# Patient Record
Sex: FEMALE | Race: WHITE
[De-identification: names, ages, dates, MRNs, and addresses within clinical notes are randomized per-mention and may not be internally consistent; named-entity substitution may affect disease eponyms.]

---

## 2017-12-16 ENCOUNTER — HOSPITAL ENCOUNTER (INPATIENT)
Dept: HOSPITAL 61 - ER | Age: 62
LOS: 3 days | Discharge: HOME | DRG: 291 | End: 2017-12-19
Attending: INTERNAL MEDICINE | Admitting: INTERNAL MEDICINE
Payer: MEDICARE

## 2017-12-16 VITALS — SYSTOLIC BLOOD PRESSURE: 135 MMHG | DIASTOLIC BLOOD PRESSURE: 71 MMHG

## 2017-12-16 VITALS — SYSTOLIC BLOOD PRESSURE: 162 MMHG | DIASTOLIC BLOOD PRESSURE: 78 MMHG

## 2017-12-16 VITALS — SYSTOLIC BLOOD PRESSURE: 151 MMHG | DIASTOLIC BLOOD PRESSURE: 73 MMHG

## 2017-12-16 VITALS — DIASTOLIC BLOOD PRESSURE: 71 MMHG | SYSTOLIC BLOOD PRESSURE: 150 MMHG

## 2017-12-16 VITALS — DIASTOLIC BLOOD PRESSURE: 77 MMHG | SYSTOLIC BLOOD PRESSURE: 130 MMHG

## 2017-12-16 VITALS — DIASTOLIC BLOOD PRESSURE: 69 MMHG | SYSTOLIC BLOOD PRESSURE: 132 MMHG

## 2017-12-16 VITALS — SYSTOLIC BLOOD PRESSURE: 158 MMHG | DIASTOLIC BLOOD PRESSURE: 77 MMHG

## 2017-12-16 VITALS — SYSTOLIC BLOOD PRESSURE: 156 MMHG | DIASTOLIC BLOOD PRESSURE: 78 MMHG

## 2017-12-16 VITALS — WEIGHT: 222.25 LBS | HEIGHT: 64 IN | BODY MASS INDEX: 37.94 KG/M2

## 2017-12-16 VITALS — DIASTOLIC BLOOD PRESSURE: 71 MMHG | SYSTOLIC BLOOD PRESSURE: 143 MMHG

## 2017-12-16 VITALS — SYSTOLIC BLOOD PRESSURE: 149 MMHG | DIASTOLIC BLOOD PRESSURE: 73 MMHG

## 2017-12-16 DIAGNOSIS — I25.10: ICD-10-CM

## 2017-12-16 DIAGNOSIS — I08.1: ICD-10-CM

## 2017-12-16 DIAGNOSIS — I50.33: ICD-10-CM

## 2017-12-16 DIAGNOSIS — E66.9: ICD-10-CM

## 2017-12-16 DIAGNOSIS — J44.0: ICD-10-CM

## 2017-12-16 DIAGNOSIS — I11.0: Primary | ICD-10-CM

## 2017-12-16 DIAGNOSIS — J18.9: ICD-10-CM

## 2017-12-16 DIAGNOSIS — Z90.49: ICD-10-CM

## 2017-12-16 DIAGNOSIS — Z98.51: ICD-10-CM

## 2017-12-16 DIAGNOSIS — E11.9: ICD-10-CM

## 2017-12-16 DIAGNOSIS — J96.21: ICD-10-CM

## 2017-12-16 DIAGNOSIS — I24.8: ICD-10-CM

## 2017-12-16 DIAGNOSIS — F32.9: ICD-10-CM

## 2017-12-16 DIAGNOSIS — R17: ICD-10-CM

## 2017-12-16 DIAGNOSIS — Z82.49: ICD-10-CM

## 2017-12-16 LAB
ALBUMIN SERPL-MCNC: 3.5 G/DL (ref 3.4–5)
ALP SERPL-CCNC: 111 U/L (ref 46–116)
ALT SERPL-CCNC: 24 U/L (ref 14–59)
ANION GAP SERPL CALC-SCNC: 10 MMOL/L (ref 6–14)
AST SERPL-CCNC: 30 U/L (ref 15–37)
BASOPHILS # BLD AUTO: 0.1 X10^3/UL (ref 0–0.2)
BASOPHILS NFR BLD: 1 % (ref 0–3)
BILIRUB DIRECT SERPL-MCNC: 0.1 MG/DL (ref 0–0.2)
BILIRUB SERPL-MCNC: 0.6 MG/DL (ref 0.2–1)
BUN SERPL-MCNC: 12 MG/DL (ref 7–20)
CALCIUM SERPL-MCNC: 8.5 MG/DL (ref 8.5–10.1)
CHLORIDE SERPL-SCNC: 100 MMOL/L (ref 98–107)
CO2 SERPL-SCNC: 30 MMOL/L (ref 21–32)
CREAT SERPL-MCNC: 0.9 MG/DL (ref 0.6–1)
EOSINOPHIL NFR BLD: 0 % (ref 0–3)
ERYTHROCYTE [DISTWIDTH] IN BLOOD BY AUTOMATED COUNT: 14.1 % (ref 11.5–14.5)
GFR SERPLBLD BASED ON 1.73 SQ M-ARVRAT: 63 ML/MIN
GLUCOSE SERPL-MCNC: 195 MG/DL (ref 70–99)
HCT VFR BLD CALC: 41.4 % (ref 36–47)
HGB BLD-MCNC: 13.6 G/DL (ref 12–15.5)
LYMPHOCYTES # BLD: 1.8 X10^3/UL (ref 1–4.8)
LYMPHOCYTES NFR BLD AUTO: 15 % (ref 24–48)
MCH RBC QN AUTO: 29 PG (ref 25–35)
MCHC RBC AUTO-ENTMCNC: 33 G/DL (ref 31–37)
MCV RBC AUTO: 89 FL (ref 79–100)
MONOCYTES NFR BLD: 7 % (ref 0–9)
NEUTROPHILS NFR BLD AUTO: 77 % (ref 31–73)
PLATELET # BLD AUTO: 330 X10^3/UL (ref 140–400)
POTASSIUM SERPL-SCNC: 4.6 MMOL/L (ref 3.5–5.1)
PROT SERPL-MCNC: 8.1 G/DL (ref 6.4–8.2)
RBC # BLD AUTO: 4.67 X10^6/UL (ref 3.5–5.4)
SODIUM SERPL-SCNC: 140 MMOL/L (ref 136–145)
WBC # BLD AUTO: 12.3 X10^3/UL (ref 4–11)

## 2017-12-16 PROCEDURE — 71010: CPT

## 2017-12-16 PROCEDURE — 80076 HEPATIC FUNCTION PANEL: CPT

## 2017-12-16 PROCEDURE — 83036 HEMOGLOBIN GLYCOSYLATED A1C: CPT

## 2017-12-16 PROCEDURE — 93005 ELECTROCARDIOGRAM TRACING: CPT

## 2017-12-16 PROCEDURE — 82962 GLUCOSE BLOOD TEST: CPT

## 2017-12-16 PROCEDURE — 87324 CLOSTRIDIUM AG IA: CPT

## 2017-12-16 PROCEDURE — 93306 TTE W/DOPPLER COMPLETE: CPT

## 2017-12-16 PROCEDURE — 83690 ASSAY OF LIPASE: CPT

## 2017-12-16 PROCEDURE — 80061 LIPID PANEL: CPT

## 2017-12-16 PROCEDURE — 71275 CT ANGIOGRAPHY CHEST: CPT

## 2017-12-16 PROCEDURE — 83880 ASSAY OF NATRIURETIC PEPTIDE: CPT

## 2017-12-16 PROCEDURE — 96374 THER/PROPH/DIAG INJ IV PUSH: CPT

## 2017-12-16 PROCEDURE — 84484 ASSAY OF TROPONIN QUANT: CPT

## 2017-12-16 PROCEDURE — 96375 TX/PRO/DX INJ NEW DRUG ADDON: CPT

## 2017-12-16 PROCEDURE — 80048 BASIC METABOLIC PNL TOTAL CA: CPT

## 2017-12-16 PROCEDURE — 87641 MR-STAPH DNA AMP PROBE: CPT

## 2017-12-16 PROCEDURE — 85025 COMPLETE CBC W/AUTO DIFF WBC: CPT

## 2017-12-16 PROCEDURE — 36415 COLL VENOUS BLD VENIPUNCTURE: CPT

## 2017-12-16 RX ADMIN — SIMVASTATIN SCH MG: 40 TABLET, FILM COATED ORAL at 21:15

## 2017-12-16 RX ADMIN — OXYBUTYNIN CHLORIDE SCH MG: 5 TABLET ORAL at 21:15

## 2017-12-16 RX ADMIN — METOPROLOL TARTRATE SCH MG: 50 TABLET, FILM COATED ORAL at 21:16

## 2017-12-16 RX ADMIN — HYDROCODONE BITARTRATE AND ACETAMINOPHEN PRN TAB: 5; 325 TABLET ORAL at 19:45

## 2017-12-16 NOTE — PDOC1
History and Physical


Date of Admission


Date of Admission


DATE: 12/16/17 


TIME: 18:21





Identification/Chief Complaint


Chief Complaint


short of breath


Problems:  





Source


Source:  Chart review, Patient





History of Present Illness


History of Present Illness





Ms. Martell, is a 62-year-old femalea admit to ICU  with acute shortness of 

breath.  > 24 hours of symptoms,  has worsened over time, she lives north of 

[facility] in [place] and was visiting her daughter the past few days, and had 

worsening weakness and dyspnea and LE swelling.  She takes lasix, but has not 

cardiac disease she is aware of, and has not had an echo that she can recall


 She has a history of hypertension diabetes and coronary artery disease. She 

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  She has never heard of interstitial lung disease. 


shortness of breath at rest today,   w. exertion for days


 no chest pain





Past Medical History


Cardiovascular:  HTN


Pulmonary:  Other (brown lung disease)


GI:  No pertinent hx


Psych:  Depression


Endocrine:  Diabetes





Family History


Family History:  Other (lung disease from glove factory)





Social History


Smoke:  No


ALCOHOL:  rare


Drugs:  None





Current Problem List


Problem List


Problems


Medical Problems:


(1) SOB (shortness of breath)


Status: Acute  








Problems:  





Current Medications


Current Medications





Current Medications


Albuterol/ Ipratropium (Duoneb) 3 ml 1X  ONCE NEB  Last administered on 12/16/ 17at 13:59;  Start 12/16/17 at 14:00;  Stop 12/16/17 at 14:01;  Status DC


Ondansetron HCl (Zofran) 4 mg STK-MED ONCE .ROUTE ;  Start 12/16/17 at 14:31;  

Stop 12/16/17 at 14:32;  Status DC


Furosemide (Lasix) 40 mg 1X  ONCE IVP  Last administered on 12/16/17at 14:40;  

Start 12/16/17 at 14:45;  Stop 12/16/17 at 14:46;  Status DC


Ondansetron HCl (Zofran) 4 mg 1X  ONCE IV  Last administered on 12/16/17at 14:37

;  Start 12/16/17 at 14:45;  Stop 12/16/17 at 14:46;  Status DC


Iohexol (Omnipaque 300 Mg/ml) 75 ml 1X  ONCE IV  Last administered on 12/16/ 17at 15:17;  Start 12/16/17 at 15:15;  Stop 12/16/17 at 15:16;  Status DC


Ondansetron HCl (Zofran) 4 mg PRN Q8HRS  PRN IV NAUSEA/VOMITING;  Start 12/16/ 17 at 15:45;  Stop 12/17/17 at 15:44


Morphine Sulfate 2 mg PRN Q2HR  PRN IV PAIN;  Start 12/16/17 at 15:45;  Stop 12/ 17/17 at 15:44


Aspirin (Children'S Aspirin) 324 mg 1X  ONCE PO  Last administered on 12/16/ 17at 16:03;  Start 12/16/17 at 16:00;  Stop 12/16/17 at 16:01;  Status DC





Active Scripts


Active


Reported


[potassium OTC]     


Hydrocodone-Apap 5-325  ** (Hydrocodone Bit/Acetaminophen) 1 Each Tablet 1 Tab 

PO PRN Q6HRS PRN


Macrobid 100 Mg Capsule (Nitrofurantoin Monohyd/M-Cryst) 100 Mg Capsule 100 Mg 

PO DAILY


Probiotic (Lactobacillus Combo No.11) 1 Each Cap.sprink 1 Each PO DAILY


Simvastatin 40 Mg Tablet 1 Tab PO QHS


Cymbalta (Duloxetine Hcl) 60 Mg Capsule.dr 1 Cap PO DAILY


Metformin Hcl Er (Metformin Hcl) 500 Mg Tab.er.24h 2 Tab PO BIDAC


Metoprolol Tartrate 100 Mg Tablet 1 Tab PO BID


Losartan Potassium 100 Mg Tablet 100 Mg PO DAILY


Lasix (Furosemide) 20 Mg Tablet 3 Tab PO DAILY


Omeprazole 40 Mg Capsule.dr 1 Cap PO DAILY


Myrbetriq (Mirabegron) 50 Mg Tab.er.24h 50 Mg PO DAILY


Procardia Xl (Nifedipine) 90 Mg Tab.er.24 1 Tab PO DAILY


Aspirin 81 Mg Tab.chew 1 Tab PO DAILY


Vitamin D3 (Cholecalciferol (Vitamin D3)) 1,000 Unit Tablet 1 Tab PO DAILY


Zyrtec (Cetirizine Hcl) 10 Mg Capsule 10 Mg PO





Allergies


Allergies:  


Coded Allergies:  


     No Known Drug Allergies (Unverified , 12/16/17)





ROS


General:  No: Chills, Night Sweats, Fatigue, Malaise, Appetite, Other


PSYCHOLOGICAL ROS:  No: Anxiety, Behavioral Disorder, Concentration difficultie

, Decreased libido, Depression, Disorientation, Hallucinations, Hostility, 

Irritablity, Memory difficulties, Mood Swings, Obsessive thoughts, Physical 

abuse, Sexual abuse, Sleep disturbances, Suicidal ideation, Other


Eyes:  No Blurry vision, No Decreased vision, No Double vision, No Dry eyes, No 

Excessive tearing, No Eye Pain, No Itchy Eyes, No Loss of vision, No Photophobia

, No Scotomata, No Uses contacts, No Uses glasses, No Other


HEENT:  No: Heacaches, Visual Changes, Hearing change, Nasal congestion, Nasal 

discharge, Oral lesions, Sinus pain, Sore Throat, Epistaxis, Sneezing, Snoring, 

Tinnitus, Vertigo, Vocal changes, Other


Respiratory:  YES: Orthopnea, Shortness of breath, SOB with excertion, Tachypnea

, 


   No: Cough, Hemoptysis, Pleuritic Pain, Sputum Changes, Stridor, Wheezing, 

Other


Cardiovascular:  yes Edema, 


   No Palpitations, No Orthopnea, No Paroxysmal Noc. Dyspnea, No Lt Headedness, 

No Other


Gastrointestinal:  Yes Nausea, 


   No Vomiting, No Abdominal Pain, No Diarrhea, No Constipation, No Melena, No 

Hematochezia, No Other


Genitourinary:  No Dysuria, No Frequency, No Incontinence, No Hematuria, No 

Retention, No Discharge, No Urgency, No Pain, No Flank Pain, No Other, No , No 

, No , No , No , No , No 


Musculoskeletal:  Yes Joint Stiffness


Neurological:  No Behavorial Changes, No Bowel/Bladder ControlChng, No Confusion

, No Dizziness, No Gait Disturbance, No Headaches, No Impaired Coord/balance, 

No Memory Loss, No Numbness/Tingling, No Seizures, No Speech Problems, No 

Tremors, No Visual Changes, No Weakness, No Other


Skin:  Yes Dry Skin





Physical Exam


General:  Alert, Oriented X3, Cooperative, mild distress


HEENT:  Atraumatic, PERRLA, EOMI, Mucous membr. moist/pink


Lungs:  Clear to auscultation


Heart:  no gallops, no murmurs


Abdomen:  Normal bowel sounds, Soft


Rectal Exam:  not examined


Extremities:  No clubbing, Normal pulses, Other (2+ LE edema pedal )


Skin:  No rashes, No breakdown


Neuro:  Normal speech, Sensation intact, Cranial nerves 3-12 NL


Psych/Mental Status:  Mental status NL, Mood NL





Vitals


Vitals





Vital Signs








  Date Time  Temp Pulse Resp B/P (MAP) Pulse Ox O2 Delivery O2 Flow Rate FiO2


 


12/16/17 18:00  74 27 130/77 (94) 95 NonRebreather Mask  


 


12/16/17 16:20 98.5       





 98.5       


 


12/16/17 16:00       10.0 











Labs


Labs





Laboratory Tests








Test


  12/16/17


13:55


 


White Blood Count


  12.3 x10^3/uL


(4.0-11.0)


 


Red Blood Count


  4.67 x10^6/uL


(3.50-5.40)


 


Hemoglobin


  13.6 g/dL


(12.0-15.5)


 


Hematocrit


  41.4 %


(36.0-47.0)


 


Mean Corpuscular Volume 89 fL () 


 


Mean Corpuscular Hemoglobin 29 pg (25-35) 


 


Mean Corpuscular Hemoglobin


Concent 33 g/dL


(31-37)


 


Red Cell Distribution Width


  14.1 %


(11.5-14.5)


 


Platelet Count


  330 x10^3/uL


(140-400)


 


Neutrophils (%) (Auto) 77 % (31-73) 


 


Lymphocytes (%) (Auto) 15 % (24-48) 


 


Monocytes (%) (Auto) 7 % (0-9) 


 


Eosinophils (%) (Auto) 0 % (0-3) 


 


Basophils (%) (Auto) 1 % (0-3) 


 


Neutrophils # (Auto)


  9.5 x10^3uL


(1.8-7.7)


 


Lymphocytes # (Auto)


  1.8 x10^3/uL


(1.0-4.8)


 


Monocytes # (Auto)


  0.8 x10^3/uL


(0.0-1.1)


 


Eosinophils # (Auto)


  0.0 x10^3/uL


(0.0-0.7)


 


Basophils # (Auto)


  0.1 x10^3/uL


(0.0-0.2)


 


Sodium Level


  140 mmol/L


(136-145)


 


Potassium Level


  4.6 mmol/L


(3.5-5.1)


 


Chloride Level


  100 mmol/L


()


 


Carbon Dioxide Level


  30 mmol/L


(21-32)


 


Anion Gap 10 (6-14) 


 


Blood Urea Nitrogen


  12 mg/dL


(7-20)


 


Creatinine


  0.9 mg/dL


(0.6-1.0)


 


Estimated GFR


(Cockcroft-Gault) 63.0 


 


 


Glucose Level


  195 mg/dL


(70-99)


 


Calcium Level


  8.5 mg/dL


(8.5-10.1)


 


Total Bilirubin


  0.6 mg/dL


(0.2-1.0)


 


Direct Bilirubin


  0.1 mg/dL


(0.0-0.2)


 


Aspartate Amino Transf


(AST/SGOT) 30 U/L (15-37) 


 


 


Alanine Aminotransferase


(ALT/SGPT) 24 U/L (14-59) 


 


 


Alkaline Phosphatase


  111 U/L


()


 


Troponin I Quantitative


  0.072 ng/mL


(0.000-0.055)


 


NT-Pro-B-Type Natriuretic


Peptide 2221 pg/mL


(0-124)


 


Total Protein


  8.1 g/dL


(6.4-8.2)


 


Albumin


  3.5 g/dL


(3.4-5.0)


 


Lipase


  78 U/L


()








Laboratory Tests








Test


  12/16/17


13:55


 


White Blood Count


  12.3 x10^3/uL


(4.0-11.0)


 


Red Blood Count


  4.67 x10^6/uL


(3.50-5.40)


 


Hemoglobin


  13.6 g/dL


(12.0-15.5)


 


Hematocrit


  41.4 %


(36.0-47.0)


 


Mean Corpuscular Volume 89 fL () 


 


Mean Corpuscular Hemoglobin 29 pg (25-35) 


 


Mean Corpuscular Hemoglobin


Concent 33 g/dL


(31-37)


 


Red Cell Distribution Width


  14.1 %


(11.5-14.5)


 


Platelet Count


  330 x10^3/uL


(140-400)


 


Neutrophils (%) (Auto) 77 % (31-73) 


 


Lymphocytes (%) (Auto) 15 % (24-48) 


 


Monocytes (%) (Auto) 7 % (0-9) 


 


Eosinophils (%) (Auto) 0 % (0-3) 


 


Basophils (%) (Auto) 1 % (0-3) 


 


Neutrophils # (Auto)


  9.5 x10^3uL


(1.8-7.7)


 


Lymphocytes # (Auto)


  1.8 x10^3/uL


(1.0-4.8)


 


Monocytes # (Auto)


  0.8 x10^3/uL


(0.0-1.1)


 


Eosinophils # (Auto)


  0.0 x10^3/uL


(0.0-0.7)


 


Basophils # (Auto)


  0.1 x10^3/uL


(0.0-0.2)


 


Sodium Level


  140 mmol/L


(136-145)


 


Potassium Level


  4.6 mmol/L


(3.5-5.1)


 


Chloride Level


  100 mmol/L


()


 


Carbon Dioxide Level


  30 mmol/L


(21-32)


 


Anion Gap 10 (6-14) 


 


Blood Urea Nitrogen


  12 mg/dL


(7-20)


 


Creatinine


  0.9 mg/dL


(0.6-1.0)


 


Estimated GFR


(Cockcroft-Gault) 63.0 


 


 


Glucose Level


  195 mg/dL


(70-99)


 


Calcium Level


  8.5 mg/dL


(8.5-10.1)


 


Total Bilirubin


  0.6 mg/dL


(0.2-1.0)


 


Direct Bilirubin


  0.1 mg/dL


(0.0-0.2)


 


Aspartate Amino Transf


(AST/SGOT) 30 U/L (15-37) 


 


 


Alanine Aminotransferase


(ALT/SGPT) 24 U/L (14-59) 


 


 


Alkaline Phosphatase


  111 U/L


()


 


Troponin I Quantitative


  0.072 ng/mL


(0.000-0.055)


 


NT-Pro-B-Type Natriuretic


Peptide 2221 pg/mL


(0-124)


 


Total Protein


  8.1 g/dL


(6.4-8.2)


 


Albumin


  3.5 g/dL


(3.4-5.0)


 


Lipase


  78 U/L


()











VTE Prophylaxis Ordered


VTE Prophylaxis Devices:  Yes


VTE Pharmacological Prophylaxi:  Yes





Assessment/Plan


Assessment/Plan


acute hypoxic respiratory failure


CHF acute diastolic failure likely





possible interstitial lung disease from working 25 years at the [employer], 

consult PULM,   she described "brown lung"





obesity, BMI 38


htn


Dm2








admit to ICU, 35 min











CHATO ZHENG MD Dec 16, 2017 18:30

## 2017-12-16 NOTE — RAD
PQRS Compliance Statement:

 

One or more of the following individualized dose reduction techniques were

utilized for this examination:  

1. Automated exposure control  

2. Adjustment of the mA and/or kV according to patient size  

3. Use of iterative reconstruction technique

 

CT ANGIOGRAPHY CHEST: 12/16/2017 3:19 PM

 

Indication: 62 years old Female. Shortness of breath.

 

Comparison studies: None.

 

Technique: Multiple contiguous axial images of the chest were obtained 

from the thoracic inlet through the upper abdomen following the 

administration of nonionic contrast. Two-D coronal and sagittal 

reconstructions were performed. Maximum intensity projection images were 

obtained at an independent workstation to further evaluate the suspected 

abnormality of the pulmonary arteries.

 

FINDINGS:

 

Thyroid gland is normal in appearance.

 

There are no pathologically enlarged axillary lymph nodes. A right 

precarinal lymph node measures 9 mm by short axis. No pathologically 

enlarged hilar lymph nodes are present. Heart size is within normal 

limits. Thoracic aorta is normal in course and caliber. Three-vessel 

coronary artery vascular calcifications are present. There is no 

pericardial effusion.

 

There are small bilateral pleural effusions with adjacent airspace 

consolidation which may represent compressive atelectasis versus 

infiltrates. There is diffuse intrahepatic bile or septal thickening with 

groundglass attenuation compatible with pulmonary vascular congestion and 

pulmonary edema. Subpleural interstitial opacities as well as alveolar 

consolidation may represent infectious/inflammatory pneumonitis. No 

pneumothorax.

 

Visualized portions of the upper abdomen are normal.

 

Moderate degenerative changes of the thoracic spine are present.

 

IMPRESSION:

1. No evidence for acute pulmonary embolism.

2. Constellation of findings are most suggestive of congestive heart 

failure with small bilateral pleural effusions with adjacent airspace 

consolidation representing either compressive atelectasis or infiltrates 

as well as pulmonary vascular congestion with interstitial and alveolar 

edema.

3. Multifocal subpleural patchy areas of consolidation to represent a 

pneumonitis of infectious/inflammatory etiology. A follow-up chest CT in 

4-6 weeks is recommended to ensure resolution.

 

Electronically signed by: Liliam Osborne MD (12/16/2017 4:15 PM) 

[facility]

## 2017-12-16 NOTE — RAD
AP chest 2/16/2016



Clinical indication: Shortness of air.



Comparison: None.



Findings: Enlargement of the cardiac silhouette with pulmonary venous

congestion and interstitial opacities. There are trace bilateral pleural

effusions. Mild bibasilar atelectasis. No pneumothorax.



Impression: Findings of CHF with cardiomegaly, interstitial edema, and trace

bilateral pleural effusions.

## 2017-12-16 NOTE — EKG
[facility]

              [address] [place] [address]

Test Date:    2017               Test Time:    14:46:52

Pat Name:     KODAK NUNEZ        Department:   

Patient ID:   [id number]           Room:         109 1

Gender:       F                        Technician:   

:          [id number]               Requested By: KT DAVIS

Order Number: [id number]            Reading MD:   Foster Mejia

                                 Measurements

Intervals                              Axis          

Rate:         78                       P:            41

PA:           174                      QRS:          8

QRSD:         82                       T:            22

QT:           398                                    

QTc:          457                                    

                           Interpretive Statements

SINUS RHYTHM

NONSPECIFIC ST-T WAVE CHANGES.

POSSIBLY ABNORMAL ECG

RI6.01

No previous ECG available for comparison



Electronically Signed On 2017 10:40:43 CST by oFster Mejia

## 2017-12-16 NOTE — EKG
[facility]

              [address] [place] [address]

Test Date:    2017               Test Time:    13:52:41

Pat Name:     KODAK NUNEZ        Department:   

Patient ID:   [id number]           Room:         109 1

Gender:       F                        Technician:   

:          [id number]               Requested By: KT DAVIS

Order Number: [id number]            Reading MD:   Foster Mejia

                                 Measurements

Intervals                              Axis          

Rate:         79                       P:            41

TX:           164                      QRS:          7

QRSD:         82                       T:            18

QT:           386                                    

QTc:          444                                    

                           Interpretive Statements

SINUS RHYTHM

LEFT ATRIAL ABNORMALITY

ABNORMAL ECG

RI6.01

No previous ECG available for comparison



Electronically Signed On 2017 11:01:54 CST by Foster Mejia

## 2017-12-17 VITALS — SYSTOLIC BLOOD PRESSURE: 141 MMHG | DIASTOLIC BLOOD PRESSURE: 76 MMHG

## 2017-12-17 VITALS — DIASTOLIC BLOOD PRESSURE: 64 MMHG | SYSTOLIC BLOOD PRESSURE: 133 MMHG

## 2017-12-17 VITALS — SYSTOLIC BLOOD PRESSURE: 153 MMHG | DIASTOLIC BLOOD PRESSURE: 74 MMHG

## 2017-12-17 VITALS — SYSTOLIC BLOOD PRESSURE: 152 MMHG | DIASTOLIC BLOOD PRESSURE: 76 MMHG

## 2017-12-17 VITALS — SYSTOLIC BLOOD PRESSURE: 143 MMHG | DIASTOLIC BLOOD PRESSURE: 74 MMHG

## 2017-12-17 VITALS — SYSTOLIC BLOOD PRESSURE: 136 MMHG | DIASTOLIC BLOOD PRESSURE: 67 MMHG

## 2017-12-17 VITALS — DIASTOLIC BLOOD PRESSURE: 80 MMHG | SYSTOLIC BLOOD PRESSURE: 154 MMHG

## 2017-12-17 VITALS — SYSTOLIC BLOOD PRESSURE: 163 MMHG | DIASTOLIC BLOOD PRESSURE: 82 MMHG

## 2017-12-17 VITALS — DIASTOLIC BLOOD PRESSURE: 72 MMHG | SYSTOLIC BLOOD PRESSURE: 144 MMHG

## 2017-12-17 VITALS — DIASTOLIC BLOOD PRESSURE: 75 MMHG | SYSTOLIC BLOOD PRESSURE: 156 MMHG

## 2017-12-17 VITALS — SYSTOLIC BLOOD PRESSURE: 169 MMHG | DIASTOLIC BLOOD PRESSURE: 86 MMHG

## 2017-12-17 VITALS — SYSTOLIC BLOOD PRESSURE: 155 MMHG | DIASTOLIC BLOOD PRESSURE: 80 MMHG

## 2017-12-17 VITALS — SYSTOLIC BLOOD PRESSURE: 126 MMHG | DIASTOLIC BLOOD PRESSURE: 65 MMHG

## 2017-12-17 VITALS — SYSTOLIC BLOOD PRESSURE: 141 MMHG | DIASTOLIC BLOOD PRESSURE: 69 MMHG

## 2017-12-17 LAB
ANION GAP SERPL CALC-SCNC: 5 MMOL/L (ref 6–14)
BASOPHILS # BLD AUTO: 0 X10^3/UL (ref 0–0.2)
BASOPHILS NFR BLD: 0 % (ref 0–3)
BUN SERPL-MCNC: 11 MG/DL (ref 7–20)
CALCIUM SERPL-MCNC: 8.5 MG/DL (ref 8.5–10.1)
CHLORIDE SERPL-SCNC: 99 MMOL/L (ref 98–107)
CO2 SERPL-SCNC: 37 MMOL/L (ref 21–32)
CREAT SERPL-MCNC: 0.7 MG/DL (ref 0.6–1)
EOSINOPHIL NFR BLD: 1 % (ref 0–3)
ERYTHROCYTE [DISTWIDTH] IN BLOOD BY AUTOMATED COUNT: 14.7 % (ref 11.5–14.5)
GFR SERPLBLD BASED ON 1.73 SQ M-ARVRAT: 84.2 ML/MIN
GLUCOSE SERPL-MCNC: 170 MG/DL (ref 70–99)
HCT VFR BLD CALC: 38.7 % (ref 36–47)
HGB BLD-MCNC: 12.7 G/DL (ref 12–15.5)
LYMPHOCYTES # BLD: 1.4 X10^3/UL (ref 1–4.8)
LYMPHOCYTES NFR BLD AUTO: 14 % (ref 24–48)
MCH RBC QN AUTO: 29 PG (ref 25–35)
MCHC RBC AUTO-ENTMCNC: 33 G/DL (ref 31–37)
MCV RBC AUTO: 89 FL (ref 79–100)
MONOCYTES NFR BLD: 6 % (ref 0–9)
NEUTROPHILS NFR BLD AUTO: 80 % (ref 31–73)
PLATELET # BLD AUTO: 310 X10^3/UL (ref 140–400)
POTASSIUM SERPL-SCNC: 4 MMOL/L (ref 3.5–5.1)
RBC # BLD AUTO: 4.37 X10^6/UL (ref 3.5–5.4)
SODIUM SERPL-SCNC: 141 MMOL/L (ref 136–145)
WBC # BLD AUTO: 10.6 X10^3/UL (ref 4–11)

## 2017-12-17 RX ADMIN — FUROSEMIDE SCH MG: 10 INJECTION, SOLUTION INTRAMUSCULAR; INTRAVENOUS at 17:54

## 2017-12-17 RX ADMIN — METOPROLOL TARTRATE SCH MG: 50 TABLET, FILM COATED ORAL at 22:58

## 2017-12-17 RX ADMIN — INSULIN ASPART SCH UNITS: 100 INJECTION, SOLUTION INTRAVENOUS; SUBCUTANEOUS at 08:00

## 2017-12-17 RX ADMIN — HYDROCODONE BITARTRATE AND ACETAMINOPHEN PRN TAB: 5; 325 TABLET ORAL at 02:41

## 2017-12-17 RX ADMIN — VITAMIN D, TAB 1000IU (100/BT) SCH UNIT: 25 TAB at 09:28

## 2017-12-17 RX ADMIN — HYDROCODONE BITARTRATE AND ACETAMINOPHEN PRN TAB: 5; 325 TABLET ORAL at 10:42

## 2017-12-17 RX ADMIN — ENOXAPARIN SODIUM SCH MG: 40 INJECTION SUBCUTANEOUS at 09:33

## 2017-12-17 RX ADMIN — HYDROCODONE BITARTRATE AND ACETAMINOPHEN PRN TAB: 5; 325 TABLET ORAL at 22:59

## 2017-12-17 RX ADMIN — PANTOPRAZOLE SODIUM SCH MG: 40 TABLET, DELAYED RELEASE ORAL at 09:28

## 2017-12-17 RX ADMIN — OXYBUTYNIN CHLORIDE SCH MG: 5 TABLET ORAL at 22:58

## 2017-12-17 RX ADMIN — SIMVASTATIN SCH MG: 40 TABLET, FILM COATED ORAL at 22:58

## 2017-12-17 RX ADMIN — OXYBUTYNIN CHLORIDE SCH MG: 5 TABLET ORAL at 15:33

## 2017-12-17 RX ADMIN — OXYBUTYNIN CHLORIDE SCH MG: 5 TABLET ORAL at 09:28

## 2017-12-17 RX ADMIN — INSULIN ASPART SCH UNITS: 100 INJECTION, SOLUTION INTRAVENOUS; SUBCUTANEOUS at 12:09

## 2017-12-17 RX ADMIN — INSULIN ASPART SCH UNITS: 100 INJECTION, SOLUTION INTRAVENOUS; SUBCUTANEOUS at 16:27

## 2017-12-17 RX ADMIN — METOPROLOL TARTRATE SCH MG: 50 TABLET, FILM COATED ORAL at 09:31

## 2017-12-17 RX ADMIN — Medication SCH CAP: at 09:28

## 2017-12-17 RX ADMIN — NITROFURANTOIN (MONOHYDRATE/MACROCRYSTALS) SCH MG: 75; 25 CAPSULE ORAL at 09:31

## 2017-12-17 RX ADMIN — ASPIRIN 81 MG SCH MG: 81 TABLET ORAL at 09:28

## 2017-12-17 RX ADMIN — FUROSEMIDE SCH MG: 10 INJECTION, SOLUTION INTRAMUSCULAR; INTRAVENOUS at 12:05

## 2017-12-17 RX ADMIN — LOSARTAN POTASSIUM SCH MG: 50 TABLET ORAL at 12:05

## 2017-12-17 NOTE — PDOC2
CONSULT


Date of Consult


Date of Consult


DATE: 12/17/17 


TIME: 16:36





Reason for Consult


Reason for Consult:


Probable heart failure





Referring Physician


Referring Physician:


Dr. Kinney





Identification/Chief Complaint


Chief Complaint


SOB


Problems:  





Source


Source:  Patient





History of Present Illness


Reason for Visit:


The patient is a pleasant 62-year-old female who reports relatively abrupt 

onset of shortness of breath yesterday. The patient was brought to the 

emergency room and initial evaluation was positive for acute hypoxic 

respiratory failure. She has been treated with pulmonary medications as well as 

mild diuresis. She reports feeling better this morning. Her chest x-ray shows 

cardio medically and interstitial edema. Her EKG shows a sinus rhythm with 

nonspecific ST-T wave changes. Troponin is minimally elevated bilirubin at 

0.101. She is feeling better this morning. She denies any chest pain, dizziness 

or lightheadedness. She denies any history of coronary artery disease but does 

report a history of hypertension, diabetes and COPD.





Past Medical History


Cardiovascular:  HTN


Pulmonary:  Other (brown lung disease)


GI:  No pertinent hx


Psych:  Depression


Endocrine:  Diabetes





Past Surgical History


Past Surgical History:  Appendectomy, Cholecystectomy, Tubal Ligation, 

Tonsillectomy





Family History


Family History:  Hypertension, Other (lung disease from [employer])





Social History


No


ALCOHOL:  rare


Drugs:  None





Current Problem List


Problem List


Problems


Medical Problems:


(1) SOB (shortness of breath)


Status: Acute  











Current Medications


Current Medications





Current Medications


Albuterol/ Ipratropium (Duoneb) 3 ml 1X  ONCE NEB  Last administered on 12/16/ 17at 13:59;  Start 12/16/17 at 14:00;  Stop 12/16/17 at 14:01;  Status DC


Ondansetron HCl (Zofran) 4 mg STK-MED ONCE .ROUTE ;  Start 12/16/17 at 14:31;  

Stop 12/16/17 at 14:32;  Status DC


Furosemide (Lasix) 40 mg 1X  ONCE IVP  Last administered on 12/16/17at 14:40;  

Start 12/16/17 at 14:45;  Stop 12/16/17 at 14:46;  Status DC


Ondansetron HCl (Zofran) 4 mg 1X  ONCE IV  Last administered on 12/16/17at 14:37

;  Start 12/16/17 at 14:45;  Stop 12/16/17 at 14:46;  Status DC


Iohexol (Omnipaque 300 Mg/ml) 75 ml 1X  ONCE IV  Last administered on 12/16/ 17at 15:17;  Start 12/16/17 at 15:15;  Stop 12/16/17 at 15:16;  Status DC


Ondansetron HCl (Zofran) 4 mg PRN Q8HRS  PRN IV NAUSEA/VOMITING;  Start 12/16/ 17 at 15:45;  Stop 12/17/17 at 15:44;  Status DC


Morphine Sulfate 2 mg PRN Q2HR  PRN IV PAIN;  Start 12/16/17 at 15:45;  Stop 12/ 17/17 at 15:44;  Status DC


Aspirin (Children'S Aspirin) 324 mg 1X  ONCE PO  Last administered on 12/16/ 17at 16:03;  Start 12/16/17 at 16:00;  Stop 12/16/17 at 16:01;  Status DC


Aspirin (Children'S Aspirin) 81 mg DAILY08 PO  Last administered on 12/17/17at 

09:28;  Start 12/17/17 at 08:00


Vitamin D (Vitamin D3) 1,000 unit DAILY PO  Last administered on 12/17/17at 09:

28;  Start 12/17/17 at 09:00


Acetaminophen/ Hydrocodone Bitart (Lortab 5/325) 1 tab PRN Q6HRS  PRN PO PAIN 

Last administered on 12/17/17at 10:42;  Start 12/16/17 at 18:30


Metformin HCl (Glucophage Xr) 1,000 mg BIDAC PO ;  Start 12/17/17 at 07:30;  

Stop 12/17/17 at 08:25;  Status DC


Nitrofurantoin Macrocrystals (Macrobid) 100 mg DAILY PO  Last administered on 12 /17/17at 09:31;  Start 12/17/17 at 09:00


Simvastatin (Zocor) 40 mg QHS PO  Last administered on 12/16/17at 21:15;  Start 

12/16/17 at 21:00


Duloxetine HCl (Cymbalta) 60 mg DAILY PO  Last administered on 12/17/17at 09:29

;  Start 12/17/17 at 09:00


Lactobacillus Rhamnosus (Culturelle) 1 cap DAILY PO  Last administered on 12/17/ 17at 09:28;  Start 12/17/17 at 09:00


Losartan Potassium (Cozaar) 100 mg DAILY PO  Last administered on 12/17/17at 12:

05;  Start 12/17/17 at 09:00


Metoprolol Tartrate (Lopressor) 100 mg BID PO  Last administered on 12/17/17at 

09:31;  Start 12/16/17 at 21:00


Oxybutynin Chloride (Ditropan) 5 mg [id number] PO  Last administered on 12/17/17at 

15:33;  Start 12/16/17 at 21:00


Nifedipine (Procardia Xl) 90 mg DAILY PO  Last administered on 12/17/17at 09:32

;  Start 12/17/17 at 09:00


Pantoprazole Sodium (Protonix) 40 mg DAILYAC PO  Last administered on 12/17/ 17at 09:28;  Start 12/17/17 at 07:30


Furosemide (Lasix) 40 mg BID92 IVP  Last administered on 12/17/17at 12:05;  

Start 12/17/17 at 09:00


Enoxaparin Sodium (Lovenox Per Pharmacy Prophylaxis Dosing) 1 each PRN DAILY  

PRN MC SEE COMMENTS;  Start 12/16/17 at 18:30


Enoxaparin Sodium (Lovenox 40mg Syringe) 40 mg DAILY SQ  Last administered on 12 /17/17at 09:33;  Start 12/17/17 at 09:00


Insulin Aspart (NovoLOG) 0-7 UNITS TIDWMEALS SQ  Last administered on 12/17/ 17at 12:09;  Start 12/17/17 at 08:00


Dextrose (Dextrose 50%-Water Syringe) 12.5 gm PRN Q15MIN  PRN IV SEE COMMENTS;  

Start 12/16/17 at 18:45


Furosemide (Lasix) 20 mg 1X  ONCE IVP ;  Start 12/16/17 at 20:30;  Stop 12/16/ 17 at 20:37;  Status DC


Phytonadione (Vitamin K Ampule) 5 mg 1X  ONCE SQ ;  Start 12/16/17 at 20:30;  

Stop 12/16/17 at 20:37;  Status DC


Guaifenesin (Robitussin Dm) 10 ml PRN Q6HRS  PRN PO COUGH;  Start 12/17/17 at 08

:15


Albuterol Sulfate (Ventolin Neb Soln) 2.5 mg PRN Q4HRS  PRN NEB SHORTNESS OF 

BREATH;  Start 12/17/17 at 08:15


Metformin HCl (Glucophage Xr) 1,000 mg BIDAC PO ;  Start 12/18/17 at 16:30


Info (Do NOT chart on this entry -- for MONITORING) 1 each PRN DAILY  PRN MC 

SEE COMMENTS;  Start 12/17/17 at 08:30;  Stop 12/18/17 at 15:15





Active Scripts


Active


Reported


[potassium OTC]     


Hydrocodone-Apap 5-325  ** (Hydrocodone Bit/Acetaminophen) 1 Each Tablet 1 Tab 

PO PRN Q6HRS PRN


Macrobid 100 Mg Capsule (Nitrofurantoin Monohyd/M-Cryst) 100 Mg Capsule 100 Mg 

PO DAILY


Probiotic (Lactobacillus Combo No.11) 1 Each Cap.sprink 1 Each PO DAILY


Simvastatin 40 Mg Tablet 1 Tab PO QHS


Cymbalta (Duloxetine Hcl) 60 Mg Capsule.dr 1 Cap PO DAILY


Metformin Hcl Er (Metformin Hcl) 500 Mg Tab.er.24h 2 Tab PO BIDAC


Metoprolol Tartrate 100 Mg Tablet 1 Tab PO BID


Losartan Potassium 100 Mg Tablet 100 Mg PO DAILY


Lasix (Furosemide) 20 Mg Tablet 3 Tab PO DAILY


Omeprazole 40 Mg Capsule.dr 1 Cap PO DAILY


Myrbetriq (Mirabegron) 50 Mg Tab.er.24h 50 Mg PO DAILY


Procardia Xl (Nifedipine) 90 Mg Tab.er.24 1 Tab PO DAILY


Aspirin 81 Mg Tab.chew 1 Tab PO DAILY


Vitamin D3 (Cholecalciferol (Vitamin D3)) 1,000 Unit Tablet 1 Tab PO DAILY


Zyrtec (Cetirizine Hcl) 10 Mg Capsule 10 Mg PO





Allergies


Allergies:  


Coded Allergies:  


     No Known Drug Allergies (Unverified , 12/16/17)





ROS


General:  YES: Fatigue


Respiratory:  YES: Shortness of breath, SOB with excertion





Physical Exam


General:  mild distress


HEENT:  Atraumatic


Lungs:  Other (decreased breath sounds)


Heart:  Regular rate


Abdomen:  Normal bowel sounds





Vitals


VITALS





Vital Signs








  Date Time  Temp Pulse Resp B/P (MAP) Pulse Ox O2 Delivery O2 Flow Rate FiO2


 


12/17/17 14:45 96.8 71 20 141/69 (93) 92 Nasal Cannula 13.0 





 96.8       











Labs


Labs





Laboratory Tests








Test


  12/16/17


13:55 12/16/17


21:10 12/17/17


03:30 12/17/17


07:54


 


White Blood Count


  12.3 x10^3/uL


(4.0-11.0) 


  10.6 x10^3/uL


(4.0-11.0) 


 


 


Red Blood Count


  4.67 x10^6/uL


(3.50-5.40) 


  4.37 x10^6/uL


(3.50-5.40) 


 


 


Hemoglobin


  13.6 g/dL


(12.0-15.5) 


  12.7 g/dL


(12.0-15.5) 


 


 


Hematocrit


  41.4 %


(36.0-47.0) 


  38.7 %


(36.0-47.0) 


 


 


Mean Corpuscular Volume 89 fL ()   89 fL ()  


 


Mean Corpuscular Hemoglobin 29 pg (25-35)   29 pg (25-35)  


 


Mean Corpuscular Hemoglobin


Concent 33 g/dL


(31-37) 


  33 g/dL


(31-37) 


 


 


Red Cell Distribution Width


  14.1 %


(11.5-14.5) 


  14.7 %


(11.5-14.5) 


 


 


Platelet Count


  330 x10^3/uL


(140-400) 


  310 x10^3/uL


(140-400) 


 


 


Neutrophils (%) (Auto) 77 % (31-73)   80 % (31-73)  


 


Lymphocytes (%) (Auto) 15 % (24-48)   14 % (24-48)  


 


Monocytes (%) (Auto) 7 % (0-9)   6 % (0-9)  


 


Eosinophils (%) (Auto) 0 % (0-3)   1 % (0-3)  


 


Basophils (%) (Auto) 1 % (0-3)   0 % (0-3)  


 


Neutrophils # (Auto)


  9.5 x10^3uL


(1.8-7.7) 


  8.4 x10^3uL


(1.8-7.7) 


 


 


Lymphocytes # (Auto)


  1.8 x10^3/uL


(1.0-4.8) 


  1.4 x10^3/uL


(1.0-4.8) 


 


 


Monocytes # (Auto)


  0.8 x10^3/uL


(0.0-1.1) 


  0.6 x10^3/uL


(0.0-1.1) 


 


 


Eosinophils # (Auto)


  0.0 x10^3/uL


(0.0-0.7) 


  0.1 x10^3/uL


(0.0-0.7) 


 


 


Basophils # (Auto)


  0.1 x10^3/uL


(0.0-0.2) 


  0.0 x10^3/uL


(0.0-0.2) 


 


 


Sodium Level


  140 mmol/L


(136-145) 


  141 mmol/L


(136-145) 


 


 


Potassium Level


  4.6 mmol/L


(3.5-5.1) 


  4.0 mmol/L


(3.5-5.1) 


 


 


Chloride Level


  100 mmol/L


() 


  99 mmol/L


() 


 


 


Carbon Dioxide Level


  30 mmol/L


(21-32) 


  37 mmol/L


(21-32) 


 


 


Anion Gap 10 (6-14)   5 (6-14)  


 


Blood Urea Nitrogen


  12 mg/dL


(7-20) 


  11 mg/dL


(7-20) 


 


 


Creatinine


  0.9 mg/dL


(0.6-1.0) 


  0.7 mg/dL


(0.6-1.0) 


 


 


Estimated GFR


(Cockcroft-Gault) 63.0 


  


  84.2 


  


 


 


Glucose Level


  195 mg/dL


(70-99) 


  170 mg/dL


(70-99) 


 


 


Calcium Level


  8.5 mg/dL


(8.5-10.1) 


  8.5 mg/dL


(8.5-10.1) 


 


 


Total Bilirubin


  0.6 mg/dL


(0.2-1.0) 


  


  


 


 


Direct Bilirubin


  0.1 mg/dL


(0.0-0.2) 


  


  


 


 


Aspartate Amino Transf


(AST/SGOT) 30 U/L (15-37) 


  


  


  


 


 


Alanine Aminotransferase


(ALT/SGPT) 24 U/L (14-59) 


  


  


  


 


 


Alkaline Phosphatase


  111 U/L


() 


  


  


 


 


Troponin I Quantitative


  0.072 ng/mL


(0.000-0.055) 0.139 ng/mL


(0.000-0.055) 0.101 ng/mL


(0.000-0.055) 


 


 


NT-Pro-B-Type Natriuretic


Peptide 2221 pg/mL


(0-124) 


  


  


 


 


Total Protein


  8.1 g/dL


(6.4-8.2) 


  


  


 


 


Albumin


  3.5 g/dL


(3.4-5.0) 


  


  


 


 


Lipase


  78 U/L


() 


  


  


 


 


Hemoglobin A1c


  


  


  7.1 %


(4.8-5.6) 


 


 


Glucose (Fingerstick)


  


  


  


  149 mg/dL


(70-99)


 


Test


  12/17/17


11:36 12/17/17


16:20 


  


 


 


Glucose (Fingerstick)


  201 mg/dL


(70-99) 133 mg/dL


(70-99) 


  


 








Laboratory Tests








Test


  12/16/17


21:10 12/17/17


03:30 12/17/17


07:54 12/17/17


11:36


 


Troponin I Quantitative


  0.139 ng/mL


(0.000-0.055) 0.101 ng/mL


(0.000-0.055) 


  


 


 


White Blood Count


  


  10.6 x10^3/uL


(4.0-11.0) 


  


 


 


Red Blood Count


  


  4.37 x10^6/uL


(3.50-5.40) 


  


 


 


Hemoglobin


  


  12.7 g/dL


(12.0-15.5) 


  


 


 


Hematocrit


  


  38.7 %


(36.0-47.0) 


  


 


 


Mean Corpuscular Volume  89 fL ()   


 


Mean Corpuscular Hemoglobin  29 pg (25-35)   


 


Mean Corpuscular Hemoglobin


Concent 


  33 g/dL


(31-37) 


  


 


 


Red Cell Distribution Width


  


  14.7 %


(11.5-14.5) 


  


 


 


Platelet Count


  


  310 x10^3/uL


(140-400) 


  


 


 


Neutrophils (%) (Auto)  80 % (31-73)   


 


Lymphocytes (%) (Auto)  14 % (24-48)   


 


Monocytes (%) (Auto)  6 % (0-9)   


 


Eosinophils (%) (Auto)  1 % (0-3)   


 


Basophils (%) (Auto)  0 % (0-3)   


 


Neutrophils # (Auto)


  


  8.4 x10^3uL


(1.8-7.7) 


  


 


 


Lymphocytes # (Auto)


  


  1.4 x10^3/uL


(1.0-4.8) 


  


 


 


Monocytes # (Auto)


  


  0.6 x10^3/uL


(0.0-1.1) 


  


 


 


Eosinophils # (Auto)


  


  0.1 x10^3/uL


(0.0-0.7) 


  


 


 


Basophils # (Auto)


  


  0.0 x10^3/uL


(0.0-0.2) 


  


 


 


Sodium Level


  


  141 mmol/L


(136-145) 


  


 


 


Potassium Level


  


  4.0 mmol/L


(3.5-5.1) 


  


 


 


Chloride Level


  


  99 mmol/L


() 


  


 


 


Carbon Dioxide Level


  


  37 mmol/L


(21-32) 


  


 


 


Anion Gap  5 (6-14)   


 


Blood Urea Nitrogen


  


  11 mg/dL


(7-20) 


  


 


 


Creatinine


  


  0.7 mg/dL


(0.6-1.0) 


  


 


 


Estimated GFR


(Cockcroft-Gault) 


  84.2 


  


  


 


 


Glucose Level


  


  170 mg/dL


(70-99) 


  


 


 


Hemoglobin A1c


  


  7.1 %


(4.8-5.6) 


  


 


 


Calcium Level


  


  8.5 mg/dL


(8.5-10.1) 


  


 


 


Glucose (Fingerstick)


  


  


  149 mg/dL


(70-99) 201 mg/dL


(70-99)


 


Test


  12/17/17


16:20 


  


  


 


 


Glucose (Fingerstick)


  133 mg/dL


(70-99) 


  


  


 











Images


Images


Chest x-ray shows cardiomegaly and interstitial edema.





Assessment/Plan


Assessment/Plan


1. Acute hypoxic respiratory failure. After treatment overnight the patient is 

feeling better. She has a history of possible occupational lung disease which 

she describes as brown lung disease. In addition to this her symptoms suggest 

possible heart failure with cardiomegaly on chest x-ray. At this time will 

continue on point medications and the patient is being followed by the 

pulmonary service. From a cardiac viewpoint will monitor lab and check an 

echocardiogram today.





2. Possible acute on chronic heart failure. As noted above the patient being 

treated by the pulmonary service. We'll attempt mild diuresis and 

echocardiogram today.





3. Minimally elevated troponin. Troponin level 0.101. No acute ischemic EKG 

changes. We'll continue present medications and recheck a morning troponin.





4. Hypertension. Blood pressures under better control. Will monitor and adjust 

medications as needed.





5. Diabetes mellitus. As per the primary service.





Thank you for allowing us to participate in the care of your pleasant patient.











GABRIELA COOK MD Dec 17, 2017 16:42

## 2017-12-17 NOTE — CARD
--------------- APPROVED REPORT --------------





EXAM: Two-dimensional and M-mode echocardiogram with Doppler and color Doppler.



Other Information 

Quality : FairHR: 73bpm

Rhythm : NSR



INDICATION

Congestive Heart Failure 



2D DIMENSIONS 

Left Atrium(2D)4.5 (1.6-4.0cm)IVSd0.9 (0.7-1.1cm)

Aortic Root(2D)2.9 (2.0-3.7cm)LVDd5.0 (3.9-5.9cm)

LVOT Diameter2.1 (1.8-2.4cm)PWd0.9 (0.7-1.1cm)

LVDs3.5 (2.5-4.0cm)FS (%) 30.8 %

SV69.1 mlLVEF(%)58.2 (>50%)

CO5.0 L/min



M-Mode DIMENSIONS 

Aortic Cusp Exc1.52 (1.5-2.0cm)



Aortic Valve

AoV Peak Noble.135.1cm/sAoV VTI29.9cm

AO Peak GR.7.3mmHgLVOT  VTI 25.75cm

AO Mean GR.5mmHg



Mitral Valve

MV E Pqdphtox999.4cm/sMV E Peak Gr.7mmHg

MV DECEL [id number] A Qfitbpaa647.2cm/s

MV E Mean Gr.3mmHgE/A  Ratio1.1

MV A [id number]



TDI

Lateral E' P. V11.31cm/sMedial E' P. V12.47cm/s

E/Lateral E'10.3E/Medial E'9.3



Pulmonary Valve

PV Peak Tqbedymr185.7cm/s



Tricuspid Valve

TR P. [id number]/sRAP [id number]

TR Peak Gr.[id number]



 LEFT VENTRICLE 

The left ventricle is normal size. There is normal left ventricular wall thickness. The left ventricu
lar systolic function is normal and the ejection fraction is within normal range. LV ejection fractio
n is 55-60%. There is normal LV segmental wall motion. No left ventricle thrombus noted on this study
. There is no ventricular septal defect visualized. There is no left ventricular aneurysm. There is n
o mass noted in the left ventricle.



 RIGHT VENTRICLE 

The right ventricle is normal size. There is normal right ventricular wall thickness. The right ventr
icular systolic function is normal.



 ATRIA 

The left atrium is borderline dilated. The right atrium size is normal. The interatrial septum is int
act with no evidence for an atrial septal defect or patent foramen ovale as noted on 2-D or Doppler i
maging.



 AORTIC VALVE 

The aortic valve is normal in structure and function. Doppler and Color Flow revealed no significant 
aortic regurgitation. There is no significant aortic valvular stenosis. There is no aortic valvular v
egetation.



 MITRAL VALVE 

The mitral valve is normal in structure and function. There is no evidence of mitral valve prolapse. 
There is no mitral valve stenosis. Doppler and Color-flow revealed mild mitral regurgitation.



 TRICUSPID VALVE 

The tricuspid valve is normal in structure and function. Doppler and Color Flow revealed mild to mode
rate tricuspid regurgitation. There is no tricuspid valve prolapse or vegetation. There is no tricusp
id valve stenosis.



 PULMONIC VALVE 

The pulmonary valve is normal in structure and function. Doppler and Color Flow revealed mild to mode
rate pulmonic valvular regurgitation.



 GREAT VESSELS 

The aortic root is normal in size. The ascending aorta is normal in size. The IVC is normal in size a
nd collapses <50% with inspiration.



 PERICARDIAL EFFUSION 

There is no pleural effusion. There is no evidence of significant pericardial effusion.



Critical Notification

Critical Value: No



<Conclusion>

The left ventricle is normal size.

The left ventricular systolic function is normal and the ejection fraction is within normal range.

LV ejection fraction is 55-60%.

There is no significant aortic valvular stenosis.

Doppler and Color Flow revealed no significant aortic regurgitation.

Doppler and Color-flow revealed mild mitral regurgitation.

Doppler and Color Flow revealed mild to moderate tricuspid regurgitation.

## 2017-12-17 NOTE — PDOC
PROGRESS NOTES


Chief Complaint


Chief Complaint


acute hypoxic respiratory failure


CHF acute diastolic failure likely


possible interstitial lung disease from working 25 years at the [employer], 

she described "brown lung"


obesity, BMI 38


htn


Dm2





History of Present Illness


History of Present Illness


Breathing better


Seen in ICU


Chest x-ray does show some minimal pleural effusion and interstitial edema.


CTA chest did not show any PE but verifies the chest x-ray findings


Troponin peaked at 0.1


Bicarbonate is 37 mildly elevated, the rest of the blood work is negative.








Plan:


Okay to transfer out of ICU


Follow pulmonary recommendations and cardiology


PT OT


Check an echocardiogram


Further conditions pending above course


Discussed with ICU RN and pulmonary





Vitals


Vitals





Vital Signs








  Date Time  Temp Pulse Resp B/P (MAP) Pulse Ox O2 Delivery O2 Flow Rate FiO2


 


12/17/17 10:42   28  91 Nasal Cannula 13.0 


 


12/17/17 09:32  75  150/83    


 


12/17/17 08:00 98.5       





 98.5       











Physical Exam


General:  Alert, Oriented X3, Cooperative, mild distress


Abdomen:  Normal bowel sounds, Soft


Extremities:  No clubbing, Normal pulses, Other (2+ LE edema pedal )


Skin:  No rashes, No breakdown





Labs


LABS





Laboratory Tests








Test


  12/16/17


13:55 12/16/17


21:10 12/17/17


03:30 12/17/17


07:54


 


White Blood Count


  12.3 x10^3/uL


(4.0-11.0) 


  10.6 x10^3/uL


(4.0-11.0) 


 


 


Red Blood Count


  4.67 x10^6/uL


(3.50-5.40) 


  4.37 x10^6/uL


(3.50-5.40) 


 


 


Hemoglobin


  13.6 g/dL


(12.0-15.5) 


  12.7 g/dL


(12.0-15.5) 


 


 


Hematocrit


  41.4 %


(36.0-47.0) 


  38.7 %


(36.0-47.0) 


 


 


Mean Corpuscular Volume 89 fL ()   89 fL ()  


 


Mean Corpuscular Hemoglobin 29 pg (25-35)   29 pg (25-35)  


 


Mean Corpuscular Hemoglobin


Concent 33 g/dL


(31-37) 


  33 g/dL


(31-37) 


 


 


Red Cell Distribution Width


  14.1 %


(11.5-14.5) 


  14.7 %


(11.5-14.5) 


 


 


Platelet Count


  330 x10^3/uL


(140-400) 


  310 x10^3/uL


(140-400) 


 


 


Neutrophils (%) (Auto) 77 % (31-73)   80 % (31-73)  


 


Lymphocytes (%) (Auto) 15 % (24-48)   14 % (24-48)  


 


Monocytes (%) (Auto) 7 % (0-9)   6 % (0-9)  


 


Eosinophils (%) (Auto) 0 % (0-3)   1 % (0-3)  


 


Basophils (%) (Auto) 1 % (0-3)   0 % (0-3)  


 


Neutrophils # (Auto)


  9.5 x10^3uL


(1.8-7.7) 


  8.4 x10^3uL


(1.8-7.7) 


 


 


Lymphocytes # (Auto)


  1.8 x10^3/uL


(1.0-4.8) 


  1.4 x10^3/uL


(1.0-4.8) 


 


 


Monocytes # (Auto)


  0.8 x10^3/uL


(0.0-1.1) 


  0.6 x10^3/uL


(0.0-1.1) 


 


 


Eosinophils # (Auto)


  0.0 x10^3/uL


(0.0-0.7) 


  0.1 x10^3/uL


(0.0-0.7) 


 


 


Basophils # (Auto)


  0.1 x10^3/uL


(0.0-0.2) 


  0.0 x10^3/uL


(0.0-0.2) 


 


 


Sodium Level


  140 mmol/L


(136-145) 


  141 mmol/L


(136-145) 


 


 


Potassium Level


  4.6 mmol/L


(3.5-5.1) 


  4.0 mmol/L


(3.5-5.1) 


 


 


Chloride Level


  100 mmol/L


() 


  99 mmol/L


() 


 


 


Carbon Dioxide Level


  30 mmol/L


(21-32) 


  37 mmol/L


(21-32) 


 


 


Anion Gap 10 (6-14)   5 (6-14)  


 


Blood Urea Nitrogen


  12 mg/dL


(7-20) 


  11 mg/dL


(7-20) 


 


 


Creatinine


  0.9 mg/dL


(0.6-1.0) 


  0.7 mg/dL


(0.6-1.0) 


 


 


Estimated GFR


(Cockcroft-Gault) 63.0 


  


  84.2 


  


 


 


Glucose Level


  195 mg/dL


(70-99) 


  170 mg/dL


(70-99) 


 


 


Calcium Level


  8.5 mg/dL


(8.5-10.1) 


  8.5 mg/dL


(8.5-10.1) 


 


 


Total Bilirubin


  0.6 mg/dL


(0.2-1.0) 


  


  


 


 


Direct Bilirubin


  0.1 mg/dL


(0.0-0.2) 


  


  


 


 


Aspartate Amino Transf


(AST/SGOT) 30 U/L (15-37) 


  


  


  


 


 


Alanine Aminotransferase


(ALT/SGPT) 24 U/L (14-59) 


  


  


  


 


 


Alkaline Phosphatase


  111 U/L


() 


  


  


 


 


Troponin I Quantitative


  0.072 ng/mL


(0.000-0.055) 0.139 ng/mL


(0.000-0.055) 0.101 ng/mL


(0.000-0.055) 


 


 


NT-Pro-B-Type Natriuretic


Peptide 2221 pg/mL


(0-124) 


  


  


 


 


Total Protein


  8.1 g/dL


(6.4-8.2) 


  


  


 


 


Albumin


  3.5 g/dL


(3.4-5.0) 


  


  


 


 


Lipase


  78 U/L


() 


  


  


 


 


Glucose (Fingerstick)


  


  


  


  149 mg/dL


(70-99)











Review of Systems


Review of Systems


A 14 point ROS was completed with the following noted as positive:





Other systems reviewed and negative.


\CONSTITUTIONAL:        No fever or chills


EYES:                          No recent changes


SKIN:               No rash or itching


CARDIOVASCULAR:     No chest pain, syncope, palpitations, or edema


RESPIRATORY:            No SOB or cough


GASTROINTESTINAL:    No nausea, vomiting or abdominal pain


NEUROLOGICAL:          No headaches or weakness


ENDOCRINE:               No cold or heat intolerance


GENITOURINARY:         No urgency or frequency of urination


MUSCULOSKELETAL:   No back pain or joint pain


LYMPHATICS:               No enlarged lymph nodes


PSYCHIATRIC:              No anxiety or depression





Assessment and Plan


Assessmemt and Plan


Problems


Medical Problems:


(1) SOB (shortness of breath)


Status: Acute  








Problems:  





Comment


Review of Relevant


I have reviewed the following items vika (where applicable) has been applied.


Labs





Laboratory Tests








Test


  12/16/17


13:55 12/16/17


21:10 12/17/17


03:30 12/17/17


07:54


 


White Blood Count


  12.3 x10^3/uL


(4.0-11.0) 


  10.6 x10^3/uL


(4.0-11.0) 


 


 


Red Blood Count


  4.67 x10^6/uL


(3.50-5.40) 


  4.37 x10^6/uL


(3.50-5.40) 


 


 


Hemoglobin


  13.6 g/dL


(12.0-15.5) 


  12.7 g/dL


(12.0-15.5) 


 


 


Hematocrit


  41.4 %


(36.0-47.0) 


  38.7 %


(36.0-47.0) 


 


 


Mean Corpuscular Volume 89 fL ()   89 fL ()  


 


Mean Corpuscular Hemoglobin 29 pg (25-35)   29 pg (25-35)  


 


Mean Corpuscular Hemoglobin


Concent 33 g/dL


(31-37) 


  33 g/dL


(31-37) 


 


 


Red Cell Distribution Width


  14.1 %


(11.5-14.5) 


  14.7 %


(11.5-14.5) 


 


 


Platelet Count


  330 x10^3/uL


(140-400) 


  310 x10^3/uL


(140-400) 


 


 


Neutrophils (%) (Auto) 77 % (31-73)   80 % (31-73)  


 


Lymphocytes (%) (Auto) 15 % (24-48)   14 % (24-48)  


 


Monocytes (%) (Auto) 7 % (0-9)   6 % (0-9)  


 


Eosinophils (%) (Auto) 0 % (0-3)   1 % (0-3)  


 


Basophils (%) (Auto) 1 % (0-3)   0 % (0-3)  


 


Neutrophils # (Auto)


  9.5 x10^3uL


(1.8-7.7) 


  8.4 x10^3uL


(1.8-7.7) 


 


 


Lymphocytes # (Auto)


  1.8 x10^3/uL


(1.0-4.8) 


  1.4 x10^3/uL


(1.0-4.8) 


 


 


Monocytes # (Auto)


  0.8 x10^3/uL


(0.0-1.1) 


  0.6 x10^3/uL


(0.0-1.1) 


 


 


Eosinophils # (Auto)


  0.0 x10^3/uL


(0.0-0.7) 


  0.1 x10^3/uL


(0.0-0.7) 


 


 


Basophils # (Auto)


  0.1 x10^3/uL


(0.0-0.2) 


  0.0 x10^3/uL


(0.0-0.2) 


 


 


Sodium Level


  140 mmol/L


(136-145) 


  141 mmol/L


(136-145) 


 


 


Potassium Level


  4.6 mmol/L


(3.5-5.1) 


  4.0 mmol/L


(3.5-5.1) 


 


 


Chloride Level


  100 mmol/L


() 


  99 mmol/L


() 


 


 


Carbon Dioxide Level


  30 mmol/L


(21-32) 


  37 mmol/L


(21-32) 


 


 


Anion Gap 10 (6-14)   5 (6-14)  


 


Blood Urea Nitrogen


  12 mg/dL


(7-20) 


  11 mg/dL


(7-20) 


 


 


Creatinine


  0.9 mg/dL


(0.6-1.0) 


  0.7 mg/dL


(0.6-1.0) 


 


 


Estimated GFR


(Cockcroft-Gault) 63.0 


  


  84.2 


  


 


 


Glucose Level


  195 mg/dL


(70-99) 


  170 mg/dL


(70-99) 


 


 


Calcium Level


  8.5 mg/dL


(8.5-10.1) 


  8.5 mg/dL


(8.5-10.1) 


 


 


Total Bilirubin


  0.6 mg/dL


(0.2-1.0) 


  


  


 


 


Direct Bilirubin


  0.1 mg/dL


(0.0-0.2) 


  


  


 


 


Aspartate Amino Transf


(AST/SGOT) 30 U/L (15-37) 


  


  


  


 


 


Alanine Aminotransferase


(ALT/SGPT) 24 U/L (14-59) 


  


  


  


 


 


Alkaline Phosphatase


  111 U/L


() 


  


  


 


 


Troponin I Quantitative


  0.072 ng/mL


(0.000-0.055) 0.139 ng/mL


(0.000-0.055) 0.101 ng/mL


(0.000-0.055) 


 


 


NT-Pro-B-Type Natriuretic


Peptide 2221 pg/mL


(0-124) 


  


  


 


 


Total Protein


  8.1 g/dL


(6.4-8.2) 


  


  


 


 


Albumin


  3.5 g/dL


(3.4-5.0) 


  


  


 


 


Lipase


  78 U/L


() 


  


  


 


 


Glucose (Fingerstick)


  


  


  


  149 mg/dL


(70-99)








Laboratory Tests








Test


  12/16/17


13:55 12/16/17


21:10 12/17/17


03:30 12/17/17


07:54


 


White Blood Count


  12.3 x10^3/uL


(4.0-11.0) 


  10.6 x10^3/uL


(4.0-11.0) 


 


 


Red Blood Count


  4.67 x10^6/uL


(3.50-5.40) 


  4.37 x10^6/uL


(3.50-5.40) 


 


 


Hemoglobin


  13.6 g/dL


(12.0-15.5) 


  12.7 g/dL


(12.0-15.5) 


 


 


Hematocrit


  41.4 %


(36.0-47.0) 


  38.7 %


(36.0-47.0) 


 


 


Mean Corpuscular Volume 89 fL ()   89 fL ()  


 


Mean Corpuscular Hemoglobin 29 pg (25-35)   29 pg (25-35)  


 


Mean Corpuscular Hemoglobin


Concent 33 g/dL


(31-37) 


  33 g/dL


(31-37) 


 


 


Red Cell Distribution Width


  14.1 %


(11.5-14.5) 


  14.7 %


(11.5-14.5) 


 


 


Platelet Count


  330 x10^3/uL


(140-400) 


  310 x10^3/uL


(140-400) 


 


 


Neutrophils (%) (Auto) 77 % (31-73)   80 % (31-73)  


 


Lymphocytes (%) (Auto) 15 % (24-48)   14 % (24-48)  


 


Monocytes (%) (Auto) 7 % (0-9)   6 % (0-9)  


 


Eosinophils (%) (Auto) 0 % (0-3)   1 % (0-3)  


 


Basophils (%) (Auto) 1 % (0-3)   0 % (0-3)  


 


Neutrophils # (Auto)


  9.5 x10^3uL


(1.8-7.7) 


  8.4 x10^3uL


(1.8-7.7) 


 


 


Lymphocytes # (Auto)


  1.8 x10^3/uL


(1.0-4.8) 


  1.4 x10^3/uL


(1.0-4.8) 


 


 


Monocytes # (Auto)


  0.8 x10^3/uL


(0.0-1.1) 


  0.6 x10^3/uL


(0.0-1.1) 


 


 


Eosinophils # (Auto)


  0.0 x10^3/uL


(0.0-0.7) 


  0.1 x10^3/uL


(0.0-0.7) 


 


 


Basophils # (Auto)


  0.1 x10^3/uL


(0.0-0.2) 


  0.0 x10^3/uL


(0.0-0.2) 


 


 


Sodium Level


  140 mmol/L


(136-145) 


  141 mmol/L


(136-145) 


 


 


Potassium Level


  4.6 mmol/L


(3.5-5.1) 


  4.0 mmol/L


(3.5-5.1) 


 


 


Chloride Level


  100 mmol/L


() 


  99 mmol/L


() 


 


 


Carbon Dioxide Level


  30 mmol/L


(21-32) 


  37 mmol/L


(21-32) 


 


 


Anion Gap 10 (6-14)   5 (6-14)  


 


Blood Urea Nitrogen


  12 mg/dL


(7-20) 


  11 mg/dL


(7-20) 


 


 


Creatinine


  0.9 mg/dL


(0.6-1.0) 


  0.7 mg/dL


(0.6-1.0) 


 


 


Estimated GFR


(Cockcroft-Gault) 63.0 


  


  84.2 


  


 


 


Glucose Level


  195 mg/dL


(70-99) 


  170 mg/dL


(70-99) 


 


 


Calcium Level


  8.5 mg/dL


(8.5-10.1) 


  8.5 mg/dL


(8.5-10.1) 


 


 


Total Bilirubin


  0.6 mg/dL


(0.2-1.0) 


  


  


 


 


Direct Bilirubin


  0.1 mg/dL


(0.0-0.2) 


  


  


 


 


Aspartate Amino Transf


(AST/SGOT) 30 U/L (15-37) 


  


  


  


 


 


Alanine Aminotransferase


(ALT/SGPT) 24 U/L (14-59) 


  


  


  


 


 


Alkaline Phosphatase


  111 U/L


() 


  


  


 


 


Troponin I Quantitative


  0.072 ng/mL


(0.000-0.055) 0.139 ng/mL


(0.000-0.055) 0.101 ng/mL


(0.000-0.055) 


 


 


NT-Pro-B-Type Natriuretic


Peptide 2221 pg/mL


(0-124) 


  


  


 


 


Total Protein


  8.1 g/dL


(6.4-8.2) 


  


  


 


 


Albumin


  3.5 g/dL


(3.4-5.0) 


  


  


 


 


Lipase


  78 U/L


() 


  


  


 


 


Glucose (Fingerstick)


  


  


  


  149 mg/dL


(70-99)








Medications





Current Medications


Albuterol/ Ipratropium (Duoneb) 3 ml 1X  ONCE NEB  Last administered on 12/16/ 17at 13:59;  Start 12/16/17 at 14:00;  Stop 12/16/17 at 14:01;  Status DC


Ondansetron HCl (Zofran) 4 mg STK-MED ONCE .ROUTE ;  Start 12/16/17 at 14:31;  

Stop 12/16/17 at 14:32;  Status DC


Furosemide (Lasix) 40 mg 1X  ONCE IVP  Last administered on 12/16/17at 14:40;  

Start 12/16/17 at 14:45;  Stop 12/16/17 at 14:46;  Status DC


Ondansetron HCl (Zofran) 4 mg 1X  ONCE IV  Last administered on 12/16/17at 14:37

;  Start 12/16/17 at 14:45;  Stop 12/16/17 at 14:46;  Status DC


Iohexol (Omnipaque 300 Mg/ml) 75 ml 1X  ONCE IV  Last administered on 12/16/ 17at 15:17;  Start 12/16/17 at 15:15;  Stop 12/16/17 at 15:16;  Status DC


Ondansetron HCl (Zofran) 4 mg PRN Q8HRS  PRN IV NAUSEA/VOMITING;  Start 12/16/ 17 at 15:45;  Stop 12/17/17 at 15:44


Morphine Sulfate 2 mg PRN Q2HR  PRN IV PAIN;  Start 12/16/17 at 15:45;  Stop 12/ 17/17 at 15:44


Aspirin (Children'S Aspirin) 324 mg 1X  ONCE PO  Last administered on 12/16/ 17at 16:03;  Start 12/16/17 at 16:00;  Stop 12/16/17 at 16:01;  Status DC


Aspirin (Children'S Aspirin) 81 mg DAILY08 PO  Last administered on 12/17/17at 

09:28;  Start 12/17/17 at 08:00


Vitamin D (Vitamin D3) 1,000 unit DAILY PO  Last administered on 12/17/17at 09:

28;  Start 12/17/17 at 09:00


Acetaminophen/ Hydrocodone Bitart (Lortab 5/325) 1 tab PRN Q6HRS  PRN PO PAIN 

Last administered on 12/17/17at 10:42;  Start 12/16/17 at 18:30


Metformin HCl (Glucophage Xr) 1,000 mg BIDAC PO ;  Start 12/17/17 at 07:30;  

Stop 12/17/17 at 08:25;  Status DC


Nitrofurantoin Macrocrystals (Macrobid) 100 mg DAILY PO  Last administered on 12 /17/17at 09:31;  Start 12/17/17 at 09:00


Simvastatin (Zocor) 40 mg QHS PO  Last administered on 12/16/17at 21:15;  Start 

12/16/17 at 21:00


Duloxetine HCl (Cymbalta) 60 mg DAILY PO  Last administered on 12/17/17at 09:29

;  Start 12/17/17 at 09:00


Lactobacillus Rhamnosus (Culturelle) 1 cap DAILY PO  Last administered on 12/17/ 17at 09:28;  Start 12/17/17 at 09:00


Losartan Potassium (Cozaar) 100 mg DAILY PO ;  Start 12/17/17 at 09:00


Metoprolol Tartrate (Lopressor) 100 mg BID PO  Last administered on 12/17/17at 

09:31;  Start 12/16/17 at 21:00


Oxybutynin Chloride (Ditropan) 5 mg [id number] PO  Last administered on 12/17/17at 

09:28;  Start 12/16/17 at 21:00


Nifedipine (Procardia Xl) 90 mg DAILY PO  Last administered on 12/17/17at 09:32

;  Start 12/17/17 at 09:00


Pantoprazole Sodium (Protonix) 40 mg DAILYAC PO  Last administered on 12/17/ 17at 09:28;  Start 12/17/17 at 07:30


Furosemide (Lasix) 40 mg BID92 IVP ;  Start 12/17/17 at 09:00


Enoxaparin Sodium (Lovenox Per Pharmacy Prophylaxis Dosing) 1 each PRN DAILY  

PRN MC SEE COMMENTS;  Start 12/16/17 at 18:30


Enoxaparin Sodium (Lovenox 40mg Syringe) 40 mg DAILY SQ  Last administered on 12 /17/17at 09:33;  Start 12/17/17 at 09:00


Insulin Aspart (NovoLOG) 0-7 UNITS TIDWMEALS SQ ;  Start 12/17/17 at 08:00


Dextrose (Dextrose 50%-Water Syringe) 12.5 gm PRN Q15MIN  PRN IV SEE COMMENTS;  

Start 12/16/17 at 18:45


Furosemide (Lasix) 20 mg 1X  ONCE IVP ;  Start 12/16/17 at 20:30;  Stop 12/16/ 17 at 20:37;  Status DC


Phytonadione (Vitamin K Ampule) 5 mg 1X  ONCE SQ ;  Start 12/16/17 at 20:30;  

Stop 12/16/17 at 20:37;  Status DC


Guaifenesin (Robitussin Dm) 10 ml PRN Q6HRS  PRN PO COUGH;  Start 12/17/17 at 08

:15


Albuterol Sulfate (Ventolin Neb Soln) 2.5 mg PRN Q4HRS  PRN NEB SHORTNESS OF 

BREATH;  Start 12/17/17 at 08:15


Metformin HCl (Glucophage Xr) 1,000 mg BIDAC PO ;  Start 12/18/17 at 16:30


Info (Do NOT chart on this entry -- for MONITORING) 1 each PRN DAILY  PRN MC 

SEE COMMENTS;  Start 12/17/17 at 08:30;  Stop 12/18/17 at 15:15





Active Scripts


Active


Reported


[potassium OTC]     


Hydrocodone-Apap 5-325  ** (Hydrocodone Bit/Acetaminophen) 1 Each Tablet 1 Tab 

PO PRN Q6HRS PRN


Macrobid 100 Mg Capsule (Nitrofurantoin Monohyd/M-Cryst) 100 Mg Capsule 100 Mg 

PO DAILY


Probiotic (Lactobacillus Combo No.11) 1 Each Cap.sprink 1 Each PO DAILY


Simvastatin 40 Mg Tablet 1 Tab PO QHS


Cymbalta (Duloxetine Hcl) 60 Mg Capsule.dr 1 Cap PO DAILY


Metformin Hcl Er (Metformin Hcl) 500 Mg Tab.er.24h 2 Tab PO BIDAC


Metoprolol Tartrate 100 Mg Tablet 1 Tab PO BID


Losartan Potassium 100 Mg Tablet 100 Mg PO DAILY


Lasix (Furosemide) 20 Mg Tablet 3 Tab PO DAILY


Omeprazole 40 Mg Capsule.dr 1 Cap PO DAILY


Myrbetriq (Mirabegron) 50 Mg Tab.er.24h 50 Mg PO DAILY


Procardia Xl (Nifedipine) 90 Mg Tab.er.24 1 Tab PO DAILY


Aspirin 81 Mg Tab.chew 1 Tab PO DAILY


Vitamin D3 (Cholecalciferol (Vitamin D3)) 1,000 Unit Tablet 1 Tab PO DAILY


Zyrtec (Cetirizine Hcl) 10 Mg Capsule 10 Mg PO


Vitals/I & O





Vital Sign - Last 24 Hours








 12/16/17 12/16/17 12/16/17 12/16/17





 13:45 13:59 14:20 14:35


 


Temp 98.4   





 98.4   


 


Pulse 80  80 76


 


Resp 20  16 16


 


B/P (MAP) 164/84 (110)  160/74 (102) 168/85 (112)


 


Pulse Ox 75 96 96 95


 


O2 Delivery Nasal Cannula NonRebreather Mask NonRebreather Mask NonRebreather 

Mask


 


O2 Flow Rate 3.0 10.0 15.0 10.0


 


    





    





 12/16/17 12/16/17 12/16/17 12/16/17





 14:50 15:05 15:30 16:00


 


Pulse 76 76 78 78


 


Resp 18 22 18 18


 


B/P (MAP) 159/76 (103) 154/77 (102) 154/77 (102) 181/91 (121)


 


Pulse Ox 94 92 95 92


 


O2 Delivery NonRebreather Mask NonRebreather Mask NonRebreather Mask 

NonRebreather Mask


 


O2 Flow Rate 10.0 10.0 10.0 10.0





 12/16/17 12/16/17 12/16/17 12/16/17





 16:20 16:20 16:30 17:00


 


Temp 98.5   





 98.5   


 


Pulse 76  76 76


 


Resp 22  22 25


 


B/P (MAP) 162/78 (106)  156/78 (104) 149/73 (98)


 


Pulse Ox 97  97 96


 


O2 Delivery NonRebreather Mask Non-Rebreather NonRebreather Mask NonRebreather 

Mask


 


    





    





 12/16/17 12/16/17 12/16/17 12/16/17





 17:30 18:00 19:00 19:45


 


Temp   98.4 





   98.4 


 


Pulse 74 74 80 


 


Resp 23 27 27 25


 


B/P (MAP) 143/71 (95) 130/77 (94) 158/77 (104) 


 


Pulse Ox 95 95 95 97


 


O2 Delivery NonRebreather Mask NonRebreather Mask NonRebreather Mask 

NonRebreather Mask


 


O2 Flow Rate    15.0


 


    





    





 12/16/17 12/16/17 12/16/17 12/16/17





 20:00 20:00 21:00 21:16


 


Pulse  79 76 75


 


Resp  20 21 


 


B/P (MAP)  150/71 (97) 151/73 (99) 151/73


 


Pulse Ox  99 95 


 


O2 Delivery Non-Rebreather NonRebreather Mask NonRebreather Mask 





 12/16/17 12/16/17 12/17/17 12/17/17





 22:00 23:00 00:00 00:00


 


Temp   98.6 





   98.6 


 


Pulse 62 60 60 


 


Resp 19 16 16 


 


B/P (MAP) 135/71 (92) 132/69 (90) 163/82 (109) 


 


Pulse Ox 97 98 97 


 


O2 Delivery NonRebreather Mask NonRebreather Mask Nasal Cannula Nasal Cannula


 


O2 Flow Rate   12.0 12.0


 


    





    





 12/17/17 12/17/17 12/17/17 12/17/17





 01:00 02:00 02:41 03:00


 


Pulse 62 70  66


 


Resp 17 25 25 15


 


B/P (MAP) 152/76 (101) 155/80 (105)  153/74 (100)


 


Pulse Ox 97 94 96 95


 


O2 Delivery Nasal Cannula Nasal Cannula Nasal Cannula Nasal Cannula


 


O2 Flow Rate 12.0 12.0 12.0 12.0





 12/17/17 12/17/17 12/17/17 12/17/17





 03:41 04:00 04:00 05:00


 


Temp   98.5 





   98.5 


 


Pulse   65 66


 


Resp 20  20 23


 


B/P (MAP)   136/67 (90) 143/74 (97)


 


Pulse Ox 94  94 96


 


O2 Delivery Nasal Cannula Nasal Cannula Nasal Cannula Nasal Cannula


 


O2 Flow Rate 12.0 12.0 12.0 12.0


 


    





    





 12/17/17 12/17/17 12/17/17 12/17/17





 06:00 07:00 08:00 08:00


 


Temp    98.5





    98.5


 


Pulse 76 68  72


 


Resp 19 25  17


 


B/P (MAP) 144/72 (96) 154/80 (104)  169/86 (113)


 


Pulse Ox 95 96  95


 


O2 Delivery Nasal Cannula High Flow Nasal Cannula Nasal Cannula High Flow Nasal 

Cannula


 


O2 Flow Rate 12.0 13.0 13.0 13.0


 


    





    





 12/17/17 12/17/17 12/17/17 12/17/17





 09:00 09:31 09:32 10:42


 


Pulse 72 75 75 


 


Resp 24   28


 


B/P (MAP) 141/76 (97) 150/83 150/83 


 


Pulse Ox 95   91


 


O2 Delivery High Flow Nasal Cannula   Nasal Cannula


 


O2 Flow Rate 13.0   13.0














Intake and Output   


 


 12/16/17 12/16/17 12/17/17





 15:00 23:00 07:00


 


Intake Total  240 ml 


 


Balance  240 ml 

















YAMILE CORTES MD Dec 17, 2017 11:07

## 2017-12-17 NOTE — CONS
DATE OF CONSULTATION:  



ATTENDING PHYSICIAN:  Dr. Kinney.



REASON FOR CONSULTATION:  Dyspnea, hypoxia.



HISTORY OF PRESENT ILLNESS:  The patient is a 62-year-old female who has no

significant history of tobacco use.  She uses oxygen at nighttime and p.r.n.

during the day.  She was brought into the hospital with complaint of shortness

of breath for the last few days.  She has lower extremity edema and had some

weight gain.  No chest pain.  She has a mild cough, which has been

nonproductive.  No fever, no chills.  The patient has a history of working in a

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  No evidence of any interstitial lung disease that she knows of. 

The patient is currently requiring 3 liters of oxygen.  She underwent imaging

study, which was reviewed by me.  She has no evidence of pulmonary embolism. 

She has small bilateral pleural effusions.  She has ground-glass infiltrates and

septal thickening suggesting congestive heart failure.  Clinically, less likely

pneumonia.  The patient did receive diuresis and she feels better.



PAST MEDICAL HISTORY:  History of hypertension, history of working in a glove

factory, questionable interstitial lung disease, history of nocturnal hypoxia,

depression, diabetes and obesity.



PAST SURGICAL HISTORY:  No recent surgery.



ALLERGIES:  None.



MEDICATIONS:  Reviewed as listed in the [facility] including Lovenox for deep venous

thrombosis prophylaxis.



REVIEW OF SYSTEMS:  Twelve-point systems review obtained.  Pertinent positives

discussed in my history of present illness, otherwise noncontributory.  All

systems that were negative were reviewed as well.



SOCIAL HISTORY:  Nonsmoker.



PHYSICAL EXAMINATION:

GENERAL:  She is awake, following commands.

VITAL SIGNS:  Blood pressure 150/83, afebrile, pulse ox 95% on nasal cannula.

HEENT:  Sclerae nonicteric.

NECK:  Supple.

LUNGS:  Diminished breath sounds.  No wheezing.

CARDIOVASCULAR:  Regular rate and rhythm.

ABDOMEN:  Soft, obese.

EXTREMITIES:  With bilateral pitting edema.



LABORATORY DATA:  Reviewed.  White cell count 10.6, hemoglobin 12.7 and

platelets are 310.  BUN is 11, creatinine 0.7.  Troponin level is 0.139.



IMPRESSION:

1.  Acute hypoxic respiratory failure secondary to most likely congestive heart

failure.

2.  Clinically, less likely pneumonia.

3.  Abnormal CT chest and findings are more consistent with congestive heart

failure rather than pneumonia.

4.  No significant history of tobacco use.



RECOMMENDATIONS:

1.  Continue with present oxygen.

2.  Continue diuresis.

3.  Follow chest x-ray.

4.  Wean FiO2 once saturation stays above 94%.

5.  Bronchodilators p.r.n.

6.  Echocardiogram.

7.  Discussed with the patient's family and will follow along with you.



Critical care time 37 minutes.

 



______________________________

DARIUS SHEPARD MD



DR:  SCOTT/maynor  JOB#:  [id number] / [id number]

DD:  12/17/2017 10:07  DT:  12/17/2017 10:24

## 2017-12-18 VITALS — DIASTOLIC BLOOD PRESSURE: 60 MMHG | SYSTOLIC BLOOD PRESSURE: 121 MMHG

## 2017-12-18 VITALS — DIASTOLIC BLOOD PRESSURE: 71 MMHG | SYSTOLIC BLOOD PRESSURE: 149 MMHG

## 2017-12-18 VITALS — SYSTOLIC BLOOD PRESSURE: 138 MMHG | DIASTOLIC BLOOD PRESSURE: 70 MMHG

## 2017-12-18 VITALS — DIASTOLIC BLOOD PRESSURE: 63 MMHG | SYSTOLIC BLOOD PRESSURE: 140 MMHG

## 2017-12-18 VITALS — DIASTOLIC BLOOD PRESSURE: 59 MMHG | SYSTOLIC BLOOD PRESSURE: 132 MMHG

## 2017-12-18 VITALS — DIASTOLIC BLOOD PRESSURE: 71 MMHG | SYSTOLIC BLOOD PRESSURE: 133 MMHG

## 2017-12-18 LAB
ANION GAP SERPL CALC-SCNC: 6 MMOL/L (ref 6–14)
BUN SERPL-MCNC: 9 MG/DL (ref 7–20)
CALCIUM SERPL-MCNC: 9 MG/DL (ref 8.5–10.1)
CHLORIDE SERPL-SCNC: 98 MMOL/L (ref 98–107)
CHOLEST SERPL-MCNC: 153 MG/DL (ref 0–200)
CHOLEST/HDLC SERPL: 3.3 {RATIO}
CO2 SERPL-SCNC: 37 MMOL/L (ref 21–32)
CREAT SERPL-MCNC: 0.7 MG/DL (ref 0.6–1)
GFR SERPLBLD BASED ON 1.73 SQ M-ARVRAT: 84.2 ML/MIN
GLUCOSE SERPL-MCNC: 137 MG/DL (ref 70–99)
HDLC SERPL-MCNC: 46 MG/DL (ref 40–60)
NONHDLC SERPL-MCNC: 107 MG/DL (ref 0–129)
POTASSIUM SERPL-SCNC: 3.4 MMOL/L (ref 3.5–5.1)
SODIUM SERPL-SCNC: 141 MMOL/L (ref 136–145)
TRIGL SERPL-MCNC: 138 MG/DL (ref 0–150)

## 2017-12-18 RX ADMIN — INSULIN ASPART SCH UNITS: 100 INJECTION, SOLUTION INTRAVENOUS; SUBCUTANEOUS at 17:00

## 2017-12-18 RX ADMIN — VITAMIN D, TAB 1000IU (100/BT) SCH UNIT: 25 TAB at 09:02

## 2017-12-18 RX ADMIN — OXYBUTYNIN CHLORIDE SCH MG: 5 TABLET ORAL at 20:53

## 2017-12-18 RX ADMIN — HYDROCODONE BITARTRATE AND ACETAMINOPHEN PRN TAB: 5; 325 TABLET ORAL at 13:12

## 2017-12-18 RX ADMIN — FUROSEMIDE SCH MG: 10 INJECTION, SOLUTION INTRAMUSCULAR; INTRAVENOUS at 09:02

## 2017-12-18 RX ADMIN — LOSARTAN POTASSIUM SCH MG: 50 TABLET ORAL at 09:02

## 2017-12-18 RX ADMIN — OXYBUTYNIN CHLORIDE SCH MG: 5 TABLET ORAL at 09:00

## 2017-12-18 RX ADMIN — METOPROLOL TARTRATE SCH MG: 50 TABLET, FILM COATED ORAL at 20:54

## 2017-12-18 RX ADMIN — ENOXAPARIN SODIUM SCH MG: 40 INJECTION SUBCUTANEOUS at 09:03

## 2017-12-18 RX ADMIN — FUROSEMIDE SCH MG: 40 TABLET ORAL at 11:58

## 2017-12-18 RX ADMIN — SIMVASTATIN SCH MG: 40 TABLET, FILM COATED ORAL at 20:54

## 2017-12-18 RX ADMIN — PANTOPRAZOLE SODIUM SCH MG: 40 TABLET, DELAYED RELEASE ORAL at 09:00

## 2017-12-18 RX ADMIN — OXYBUTYNIN CHLORIDE SCH MG: 5 TABLET ORAL at 14:41

## 2017-12-18 RX ADMIN — METOPROLOL TARTRATE SCH MG: 50 TABLET, FILM COATED ORAL at 09:01

## 2017-12-18 RX ADMIN — HYDROCODONE BITARTRATE AND ACETAMINOPHEN PRN TAB: 5; 325 TABLET ORAL at 20:55

## 2017-12-18 RX ADMIN — ASPIRIN 81 MG SCH MG: 81 TABLET ORAL at 09:02

## 2017-12-18 RX ADMIN — INSULIN ASPART SCH UNITS: 100 INJECTION, SOLUTION INTRAVENOUS; SUBCUTANEOUS at 11:58

## 2017-12-18 RX ADMIN — INSULIN ASPART SCH UNITS: 100 INJECTION, SOLUTION INTRAVENOUS; SUBCUTANEOUS at 08:00

## 2017-12-18 RX ADMIN — Medication SCH CAP: at 09:01

## 2017-12-18 RX ADMIN — NITROFURANTOIN (MONOHYDRATE/MACROCRYSTALS) SCH MG: 75; 25 CAPSULE ORAL at 09:02

## 2017-12-18 NOTE — PDOC
PROGRESS NOTES


Subjective


Subjective


Patient seen and examined


The patient looks and feels better today.





Objective


Objective





Vital Signs








  Date Time  Temp Pulse Resp B/P (MAP) Pulse Ox O2 Delivery O2 Flow Rate FiO2


 


12/18/17 14:45 98.4 60 18 121/60 (80) 93 Nasal Cannula 8.0 





 98.4       














Intake and Output 


 


 12/18/17





 07:00


 


Intake Total 1080 ml


 


Output Total 750 ml


 


Balance 330 ml


 


 


 


Intake Oral 1080 ml


 


Output Urine Total 750 ml


 


# Voids 4


 


# Bowel Movements 1











Physical Exam


Abdomen:  Normal bowel sounds


Heart:  Regular rate


General:  No acute distress


Lungs:  Other (slightly decreased breath sounds)





Assessment


Assessment


Problems


Medical Problems:


(1) SOB (shortness of breath)


Status: Acute  





1. Acute hypoxic respiratory failure. Improved today. She has a history of 

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 

Echocardiogram shows normal left ventricular systolic function with mild mitral 

regurgitation and mild to moderate tricuspid regurgitation. Agree with present 

treatment. Followed by the pulmonary service.





2. Possible acute on chronic heart failure. As noted above the patient's ECHO 

shows intact LV systolic function.  Continue present treatment.





3. Minimally elevated troponin. Troponin level 0.101. No acute ischemic EKG 

changes. Intact 





4. Hypertension. Blood pressures under better control. Wi Patient's echo shows 

intact LV systolic function.LV systolic function.  Continue medical treatment.





5. Diabetes mellitus. As per the primary service.





Comment


Review of Relevant


I have reviewed the following items vika (where applicable) has been applied.


Labs





Laboratory Tests








Test


  12/16/17


21:10 12/17/17


03:30 12/17/17


07:54 12/17/17


09:30


 


Troponin I Quantitative


  0.139 ng/mL


(0.000-0.055) 0.101 ng/mL


(0.000-0.055) 


  


 


 


White Blood Count


  


  10.6 x10^3/uL


(4.0-11.0) 


  


 


 


Red Blood Count


  


  4.37 x10^6/uL


(3.50-5.40) 


  


 


 


Hemoglobin


  


  12.7 g/dL


(12.0-15.5) 


  


 


 


Hematocrit


  


  38.7 %


(36.0-47.0) 


  


 


 


Mean Corpuscular Volume  89 fL ()   


 


Mean Corpuscular Hemoglobin  29 pg (25-35)   


 


Mean Corpuscular Hemoglobin


Concent 


  33 g/dL


(31-37) 


  


 


 


Red Cell Distribution Width


  


  14.7 %


(11.5-14.5) 


  


 


 


Platelet Count


  


  310 x10^3/uL


(140-400) 


  


 


 


Neutrophils (%) (Auto)  80 % (31-73)   


 


Lymphocytes (%) (Auto)  14 % (24-48)   


 


Monocytes (%) (Auto)  6 % (0-9)   


 


Eosinophils (%) (Auto)  1 % (0-3)   


 


Basophils (%) (Auto)  0 % (0-3)   


 


Neutrophils # (Auto)


  


  8.4 x10^3uL


(1.8-7.7) 


  


 


 


Lymphocytes # (Auto)


  


  1.4 x10^3/uL


(1.0-4.8) 


  


 


 


Monocytes # (Auto)


  


  0.6 x10^3/uL


(0.0-1.1) 


  


 


 


Eosinophils # (Auto)


  


  0.1 x10^3/uL


(0.0-0.7) 


  


 


 


Basophils # (Auto)


  


  0.0 x10^3/uL


(0.0-0.2) 


  


 


 


Sodium Level


  


  141 mmol/L


(136-145) 


  


 


 


Potassium Level


  


  4.0 mmol/L


(3.5-5.1) 


  


 


 


Chloride Level


  


  99 mmol/L


() 


  


 


 


Carbon Dioxide Level


  


  37 mmol/L


(21-32) 


  


 


 


Anion Gap  5 (6-14)   


 


Blood Urea Nitrogen


  


  11 mg/dL


(7-20) 


  


 


 


Creatinine


  


  0.7 mg/dL


(0.6-1.0) 


  


 


 


Estimated GFR


(Cockcroft-Gault) 


  84.2 


  


  


 


 


Glucose Level


  


  170 mg/dL


(70-99) 


  


 


 


Hemoglobin A1c


  


  7.1 %


(4.8-5.6) 


  


 


 


Calcium Level


  


  8.5 mg/dL


(8.5-10.1) 


  


 


 


Glucose (Fingerstick)


  


  


  149 mg/dL


(70-99) 


 


 


Clostridium difficile Toxin


(PCR) 


  


  


  Negative


(Negative)


 


Test


  12/17/17


11:36 12/17/17


16:20 12/17/17


21:02 12/18/17


03:50


 


Glucose (Fingerstick)


  201 mg/dL


(70-99) 133 mg/dL


(70-99) 172 mg/dL


(70-99) 


 


 


Sodium Level


  


  


  


  141 mmol/L


(136-145)


 


Potassium Level


  


  


  


  3.4 mmol/L


(3.5-5.1)


 


Chloride Level


  


  


  


  98 mmol/L


()


 


Carbon Dioxide Level


  


  


  


  37 mmol/L


(21-32)


 


Anion Gap    6 (6-14) 


 


Blood Urea Nitrogen    9 mg/dL (7-20) 


 


Creatinine


  


  


  


  0.7 mg/dL


(0.6-1.0)


 


Estimated GFR


(Cockcroft-Gault) 


  


  


  84.2 


 


 


Glucose Level


  


  


  


  137 mg/dL


(70-99)


 


Calcium Level


  


  


  


  9.0 mg/dL


(8.5-10.1)


 


Troponin I Quantitative


  


  


  


  0.043 ng/mL


(0.000-0.055)


 


Triglycerides Level


  


  


  


  138 mg/dL


(0-150)


 


Cholesterol Level


  


  


  


  153 mg/dL


(0-200)


 


LDL Cholesterol, Calculated


  


  


  


  79 mg/dL


(0-100)


 


VLDL Cholesterol, Calculated


  


  


  


  28 mg/dL


(0-40)


 


Non-HDL Cholesterol Calculated


  


  


  


  107 mg/dL


(0-129)


 


HDL Cholesterol


  


  


  


  46 mg/dL


(40-60)


 


Cholesterol/HDL Ratio    3.3 


 


Test


  12/18/17


07:25 12/18/17


11:11 


  


 


 


Glucose (Fingerstick)


  139 mg/dL


(70-99) 146 mg/dL


(70-99) 


  


 








Laboratory Tests








Test


  12/17/17


16:20 12/17/17


21:02 12/18/17


03:50 12/18/17


07:25


 


Glucose (Fingerstick)


  133 mg/dL


(70-99) 172 mg/dL


(70-99) 


  139 mg/dL


(70-99)


 


Sodium Level


  


  


  141 mmol/L


(136-145) 


 


 


Potassium Level


  


  


  3.4 mmol/L


(3.5-5.1) 


 


 


Chloride Level


  


  


  98 mmol/L


() 


 


 


Carbon Dioxide Level


  


  


  37 mmol/L


(21-32) 


 


 


Anion Gap   6 (6-14)  


 


Blood Urea Nitrogen   9 mg/dL (7-20)  


 


Creatinine


  


  


  0.7 mg/dL


(0.6-1.0) 


 


 


Estimated GFR


(Cockcroft-Gault) 


  


  84.2 


  


 


 


Glucose Level


  


  


  137 mg/dL


(70-99) 


 


 


Calcium Level


  


  


  9.0 mg/dL


(8.5-10.1) 


 


 


Troponin I Quantitative


  


  


  0.043 ng/mL


(0.000-0.055) 


 


 


Triglycerides Level


  


  


  138 mg/dL


(0-150) 


 


 


Cholesterol Level


  


  


  153 mg/dL


(0-200) 


 


 


LDL Cholesterol, Calculated


  


  


  79 mg/dL


(0-100) 


 


 


VLDL Cholesterol, Calculated


  


  


  28 mg/dL


(0-40) 


 


 


Non-HDL Cholesterol Calculated


  


  


  107 mg/dL


(0-129) 


 


 


HDL Cholesterol


  


  


  46 mg/dL


(40-60) 


 


 


Cholesterol/HDL Ratio   3.3  


 


Test


  12/18/17


11:11 


  


  


 


 


Glucose (Fingerstick)


  146 mg/dL


(70-99) 


  


  


 








Medications





Current Medications


Albuterol/ Ipratropium (Duoneb) 3 ml 1X  ONCE NEB  Last administered on 12/16/ 17at 13:59;  Start 12/16/17 at 14:00;  Stop 12/16/17 at 14:01;  Status DC


Ondansetron HCl (Zofran) 4 mg STK-MED ONCE .ROUTE ;  Start 12/16/17 at 14:31;  

Stop 12/16/17 at 14:32;  Status DC


Furosemide (Lasix) 40 mg 1X  ONCE IVP  Last administered on 12/16/17at 14:40;  

Start 12/16/17 at 14:45;  Stop 12/16/17 at 14:46;  Status DC


Ondansetron HCl (Zofran) 4 mg 1X  ONCE IV  Last administered on 12/16/17at 14:37

;  Start 12/16/17 at 14:45;  Stop 12/16/17 at 14:46;  Status DC


Iohexol (Omnipaque 300 Mg/ml) 75 ml 1X  ONCE IV  Last administered on 12/16/ 17at 15:17;  Start 12/16/17 at 15:15;  Stop 12/16/17 at 15:16;  Status DC


Ondansetron HCl (Zofran) 4 mg PRN Q8HRS  PRN IV NAUSEA/VOMITING;  Start 12/16/ 17 at 15:45;  Stop 12/17/17 at 15:44;  Status DC


Morphine Sulfate 2 mg PRN Q2HR  PRN IV PAIN;  Start 12/16/17 at 15:45;  Stop 12/ 17/17 at 15:44;  Status DC


Aspirin (Children'S Aspirin) 324 mg 1X  ONCE PO  Last administered on 12/16/ 17at 16:03;  Start 12/16/17 at 16:00;  Stop 12/16/17 at 16:01;  Status DC


Aspirin (Children'S Aspirin) 81 mg DAILY08 PO  Last administered on 12/18/17at 

09:02;  Start 12/17/17 at 08:00


Vitamin D (Vitamin D3) 1,000 unit DAILY PO  Last administered on 12/18/17at 09:

02;  Start 12/17/17 at 09:00


Acetaminophen/ Hydrocodone Bitart (Lortab 5/325) 1 tab PRN Q6HRS  PRN PO PAIN 

Last administered on 12/18/17at 13:12;  Start 12/16/17 at 18:30


Metformin HCl (Glucophage Xr) 1,000 mg BIDAC PO ;  Start 12/17/17 at 07:30;  

Stop 12/17/17 at 08:25;  Status DC


Nitrofurantoin Macrocrystals (Macrobid) 100 mg DAILY PO  Last administered on 12 /18/17at 09:02;  Start 12/17/17 at 09:00


Simvastatin (Zocor) 40 mg QHS PO  Last administered on 12/17/17at 22:58;  Start 

12/16/17 at 21:00


Duloxetine HCl (Cymbalta) 60 mg DAILY PO  Last administered on 12/18/17at 09:03

;  Start 12/17/17 at 09:00


Lactobacillus Rhamnosus (Culturelle) 1 cap DAILY PO  Last administered on 12/18/ 17at 09:01;  Start 12/17/17 at 09:00


Losartan Potassium (Cozaar) 100 mg DAILY PO  Last administered on 12/18/17at 09:

02;  Start 12/17/17 at 09:00


Metoprolol Tartrate (Lopressor) 100 mg BID PO  Last administered on 12/18/17at 

09:01;  Start 12/16/17 at 21:00


Oxybutynin Chloride (Ditropan) 5 mg [id number] PO  Last administered on 12/18/17at 

14:41;  Start 12/16/17 at 21:00


Nifedipine (Procardia Xl) 90 mg DAILY PO  Last administered on 12/18/17at 09:01

;  Start 12/17/17 at 09:00


Pantoprazole Sodium (Protonix) 40 mg DAILYAC PO  Last administered on 12/18/ 17at 09:00;  Start 12/17/17 at 07:30


Furosemide (Lasix) 40 mg [id number] IVP  Last administered on 12/18/17at 09:02;  

Start 12/17/17 at 09:00;  Stop 12/18/17 at 10:04;  Status DC


Enoxaparin Sodium (Lovenox Per Pharmacy Prophylaxis Dosing) 1 each PRN DAILY  

PRN MC SEE COMMENTS;  Start 12/16/17 at 18:30


Enoxaparin Sodium (Lovenox 40mg Syringe) 40 mg DAILY SQ  Last administered on 12 /18/17at 09:03;  Start 12/17/17 at 09:00


Insulin Aspart (NovoLOG) 0-7 UNITS TIDWMEALS SQ  Last administered on 12/17/ 17at 12:09;  Start 12/17/17 at 08:00


Dextrose (Dextrose 50%-Water Syringe) 12.5 gm PRN Q15MIN  PRN IV SEE COMMENTS;  

Start 12/16/17 at 18:45


Furosemide (Lasix) 20 mg 1X  ONCE IVP ;  Start 12/16/17 at 20:30;  Stop 12/16/ 17 at 20:37;  Status DC


Phytonadione (Vitamin K Ampule) 5 mg 1X  ONCE SQ ;  Start 12/16/17 at 20:30;  

Stop 12/16/17 at 20:37;  Status DC


Guaifenesin (Robitussin Dm) 10 ml PRN Q6HRS  PRN PO COUGH;  Start 12/17/17 at 08

:15


Albuterol Sulfate (Ventolin Neb Soln) 2.5 mg PRN Q4HRS  PRN NEB SHORTNESS OF 

BREATH;  Start 12/17/17 at 08:15


Metformin HCl (Glucophage Xr) 1,000 mg BIDAC PO ;  Start 12/18/17 at 16:30


Info (Do NOT chart on this entry -- for MONITORING) 1 each PRN DAILY  PRN MC 

SEE COMMENTS;  Start 12/17/17 at 08:30;  Stop 12/18/17 at 15:15;  Status DC


Clonidine HCl (Catapres) 0.1 mg PRN Q1HR  PRN PO HYPERTENSION, SEE COMMENTS;  

Start 12/17/17 at 21:45


Furosemide (Lasix) 40 mg BIDACBL PO  Last administered on 12/18/17at 11:58;  

Start 12/18/17 at 11:30


Potassium Chloride (Klor-Con) 40 meq 1X  ONCE PO  Last administered on 12/18/ 17at 11:58;  Start 12/18/17 at 10:30;  Stop 12/18/17 at 10:31;  Status DC





Active Scripts


Active


Reported


[potassium OTC]     


Hydrocodone-Apap 5-325  ** (Hydrocodone Bit/Acetaminophen) 1 Each Tablet 1 Tab 

PO PRN Q6HRS PRN


Macrobid 100 Mg Capsule (Nitrofurantoin Monohyd/M-Cryst) 100 Mg Capsule 100 Mg 

PO DAILY


Probiotic (Lactobacillus Combo No.11) 1 Each Cap.sprink 1 Each PO DAILY


Simvastatin 40 Mg Tablet 1 Tab PO QHS


Cymbalta (Duloxetine Hcl) 60 Mg Capsule.dr 1 Cap PO DAILY


Metformin Hcl Er (Metformin Hcl) 500 Mg Tab.er.24h 2 Tab PO BIDAC


Metoprolol Tartrate 100 Mg Tablet 1 Tab PO BID


Losartan Potassium 100 Mg Tablet 100 Mg PO DAILY


Lasix (Furosemide) 20 Mg Tablet 3 Tab PO DAILY


Omeprazole 40 Mg Capsule.dr 1 Cap PO DAILY


Myrbetriq (Mirabegron) 50 Mg Tab.er.24h 50 Mg PO DAILY


Procardia Xl (Nifedipine) 90 Mg Tab.er.24 1 Tab PO DAILY


Aspirin 81 Mg Tab.chew 1 Tab PO DAILY


Vitamin D3 (Cholecalciferol (Vitamin D3)) 1,000 Unit Tablet 1 Tab PO DAILY


Zyrtec (Cetirizine Hcl) 10 Mg Capsule 10 Mg PO


Vitals/I & O





Vital Sign - Last 24 Hours








 12/17/17 12/17/17 12/17/17 12/17/17





 19:00 20:14 22:58 22:59


 


Temp 98.1   





 98.1   


 


Pulse 74  74 


 


Resp 18   18


 


B/P (MAP) 133/64 (87)  133/64 


 


Pulse Ox 93   93


 


O2 Delivery  Nasal Cannula  Nasal Cannula


 


O2 Flow Rate  13.0  13.0


 


    





    





 12/17/17 12/18/17 12/18/17 12/18/17





 23:00 00:09 03:00 07:00


 


Temp 98.0  97.7 97.7





 98.0  97.7 97.7


 


Pulse 68  62 60


 


Resp 18 18 16 17


 


B/P (MAP) 126/65 (85)  138/70 (92) 140/63 (88)


 


Pulse Ox 92  92 94


 


O2 Delivery    Nasal Cannula


 


O2 Flow Rate    10.0


 


    





    





 12/18/17 12/18/17 12/18/17 12/18/17





 08:05 09:01 09:01 09:02


 


Pulse  60 60 60


 


B/P (MAP)  140/63 140/63 140/63


 


O2 Delivery Nasal Cannula   


 


O2 Flow Rate 10.0   





 12/18/17 12/18/17 12/18/17 12/18/17





 10:49 13:12 14:36 14:45


 


Temp 98.2   98.4





 98.2   98.4


 


Pulse 71   60


 


Resp 18   18


 


B/P (MAP) 149/71 (97)   121/60 (80)


 


Pulse Ox 95  95 93


 


O2 Delivery Nasal Cannula Nasal Cannula Nasal Cannula Nasal Cannula


 


O2 Flow Rate 8.0 8.0 8.0 8.0














Intake and Output   


 


 12/17/17 12/17/17 12/18/17





 15:00 23:00 07:00


 


Intake Total 600 ml 360 ml 120 ml


 


Output Total   750 ml


 


Balance 600 ml 360 ml -630 ml

















GABRIELA COOK MD Dec 18, 2017 16:19

## 2017-12-18 NOTE — PDOC
PROGRESS NOTES


Chief Complaint


Chief Complaint


acute on chronic hypoxic respiratory failure,  on 3 liters at baseline at home


CHF acute on chronic diastolic failure clinically,   this is not supported by 

Echo results


obesity, BMI 38


htn


Dm2





History of Present Illness


History of Present Illness


Breathing better


out of ICU


still on 8 liters nasal cannula, 


Chest x-ray does show some minimal pleural effusion and interstitial edema.


Echo showed EF 55%


Troponin peaked at 0.1,  now back down


 


can dc if able to tolerate less than 6 liters NC





Vitals


Vitals





Vital Signs








  Date Time  Temp Pulse Resp B/P (MAP) Pulse Ox O2 Delivery O2 Flow Rate FiO2


 


12/18/17 09:02  60  140/63    


 


12/18/17 07:00 97.7  17  94 Nasal Cannula 10.0 





 97.7       











Physical Exam


General:  Alert, Oriented X3, Cooperative, No acute distress


Heart:  Regular rate, No murmurs


Lungs:  Clear, Other (good vol)


Abdomen:  Normal bowel sounds


Extremities:  No clubbing, Normal pulses, Other (2+ LE edema pedal )


Skin:  No rashes, No breakdown





Labs


LABS





Laboratory Tests








Test


  12/17/17


11:36 12/17/17


16:20 12/17/17


21:02 12/18/17


03:50


 


Glucose (Fingerstick)


  201 mg/dL


(70-99) 133 mg/dL


(70-99) 172 mg/dL


(70-99) 


 


 


Sodium Level


  


  


  


  141 mmol/L


(136-145)


 


Potassium Level


  


  


  


  3.4 mmol/L


(3.5-5.1)


 


Chloride Level


  


  


  


  98 mmol/L


()


 


Carbon Dioxide Level


  


  


  


  37 mmol/L


(21-32)


 


Anion Gap    6 (6-14) 


 


Blood Urea Nitrogen    9 mg/dL (7-20) 


 


Creatinine


  


  


  


  0.7 mg/dL


(0.6-1.0)


 


Estimated GFR


(Cockcroft-Gault) 


  


  


  84.2 


 


 


Glucose Level


  


  


  


  137 mg/dL


(70-99)


 


Calcium Level


  


  


  


  9.0 mg/dL


(8.5-10.1)


 


Troponin I Quantitative


  


  


  


  0.043 ng/mL


(0.000-0.055)


 


Triglycerides Level


  


  


  


  138 mg/dL


(0-150)


 


Cholesterol Level


  


  


  


  153 mg/dL


(0-200)


 


LDL Cholesterol, Calculated


  


  


  


  79 mg/dL


(0-100)


 


VLDL Cholesterol, Calculated


  


  


  


  28 mg/dL


(0-40)


 


Non-HDL Cholesterol Calculated


  


  


  


  107 mg/dL


(0-129)


 


HDL Cholesterol


  


  


  


  46 mg/dL


(40-60)


 


Cholesterol/HDL Ratio    3.3 


 


Test


  12/18/17


07:25 


  


  


 


 


Glucose (Fingerstick)


  139 mg/dL


(70-99) 


  


  


 











Review of Systems


Review of Systems


no nv../d





Assessment and Plan


Assessmemt and Plan


Problems


Medical Problems:


(1) SOB (shortness of breath)


Status: Acute  








Problems:  





Comment


Review of Relevant


I have reviewed the following items vika (where applicable) has been applied.


Labs





Laboratory Tests








Test


  12/16/17


13:55 12/16/17


16:04 12/16/17


21:10 12/17/17


03:30


 


White Blood Count


  12.3 x10^3/uL


(4.0-11.0) 


  


  10.6 x10^3/uL


(4.0-11.0)


 


Red Blood Count


  4.67 x10^6/uL


(3.50-5.40) 


  


  4.37 x10^6/uL


(3.50-5.40)


 


Hemoglobin


  13.6 g/dL


(12.0-15.5) 


  


  12.7 g/dL


(12.0-15.5)


 


Hematocrit


  41.4 %


(36.0-47.0) 


  


  38.7 %


(36.0-47.0)


 


Mean Corpuscular Volume 89 fL ()    89 fL () 


 


Mean Corpuscular Hemoglobin 29 pg (25-35)    29 pg (25-35) 


 


Mean Corpuscular Hemoglobin


Concent 33 g/dL


(31-37) 


  


  33 g/dL


(31-37)


 


Red Cell Distribution Width


  14.1 %


(11.5-14.5) 


  


  14.7 %


(11.5-14.5)


 


Platelet Count


  330 x10^3/uL


(140-400) 


  


  310 x10^3/uL


(140-400)


 


Neutrophils (%) (Auto) 77 % (31-73)    80 % (31-73) 


 


Lymphocytes (%) (Auto) 15 % (24-48)    14 % (24-48) 


 


Monocytes (%) (Auto) 7 % (0-9)    6 % (0-9) 


 


Eosinophils (%) (Auto) 0 % (0-3)    1 % (0-3) 


 


Basophils (%) (Auto) 1 % (0-3)    0 % (0-3) 


 


Neutrophils # (Auto)


  9.5 x10^3uL


(1.8-7.7) 


  


  8.4 x10^3uL


(1.8-7.7)


 


Lymphocytes # (Auto)


  1.8 x10^3/uL


(1.0-4.8) 


  


  1.4 x10^3/uL


(1.0-4.8)


 


Monocytes # (Auto)


  0.8 x10^3/uL


(0.0-1.1) 


  


  0.6 x10^3/uL


(0.0-1.1)


 


Eosinophils # (Auto)


  0.0 x10^3/uL


(0.0-0.7) 


  


  0.1 x10^3/uL


(0.0-0.7)


 


Basophils # (Auto)


  0.1 x10^3/uL


(0.0-0.2) 


  


  0.0 x10^3/uL


(0.0-0.2)


 


Sodium Level


  140 mmol/L


(136-145) 


  


  141 mmol/L


(136-145)


 


Potassium Level


  4.6 mmol/L


(3.5-5.1) 


  


  4.0 mmol/L


(3.5-5.1)


 


Chloride Level


  100 mmol/L


() 


  


  99 mmol/L


()


 


Carbon Dioxide Level


  30 mmol/L


(21-32) 


  


  37 mmol/L


(21-32)


 


Anion Gap 10 (6-14)    5 (6-14) 


 


Blood Urea Nitrogen


  12 mg/dL


(7-20) 


  


  11 mg/dL


(7-20)


 


Creatinine


  0.9 mg/dL


(0.6-1.0) 


  


  0.7 mg/dL


(0.6-1.0)


 


Estimated GFR


(Cockcroft-Gault) 63.0 


  


  


  84.2 


 


 


Glucose Level


  195 mg/dL


(70-99) 


  


  170 mg/dL


(70-99)


 


Calcium Level


  8.5 mg/dL


(8.5-10.1) 


  


  8.5 mg/dL


(8.5-10.1)


 


Total Bilirubin


  0.6 mg/dL


(0.2-1.0) 


  


  


 


 


Direct Bilirubin


  0.1 mg/dL


(0.0-0.2) 


  


  


 


 


Aspartate Amino Transf


(AST/SGOT) 30 U/L (15-37) 


  


  


  


 


 


Alanine Aminotransferase


(ALT/SGPT) 24 U/L (14-59) 


  


  


  


 


 


Alkaline Phosphatase


  111 U/L


() 


  


  


 


 


Troponin I Quantitative


  0.072 ng/mL


(0.000-0.055) 


  0.139 ng/mL


(0.000-0.055) 0.101 ng/mL


(0.000-0.055)


 


NT-Pro-B-Type Natriuretic


Peptide 2221 pg/mL


(0-124) 


  


  


 


 


Total Protein


  8.1 g/dL


(6.4-8.2) 


  


  


 


 


Albumin


  3.5 g/dL


(3.4-5.0) 


  


  


 


 


Lipase


  78 U/L


() 


  


  


 


 


Nasal Screen MRSA (PCR)


  


  Negative


(Negative) 


  


 


 


Hemoglobin A1c


  


  


  


  7.1 %


(4.8-5.6)


 


Test


  12/17/17


07:54 12/17/17


09:30 12/17/17


11:36 12/17/17


16:20


 


Glucose (Fingerstick)


  149 mg/dL


(70-99) 


  201 mg/dL


(70-99) 133 mg/dL


(70-99)


 


Clostridium difficile Toxin


(PCR) 


  Negative


(Negative) 


  


 


 


Test


  12/17/17


21:02 12/18/17


03:50 12/18/17


07:25 


 


 


Glucose (Fingerstick)


  172 mg/dL


(70-99) 


  139 mg/dL


(70-99) 


 


 


Sodium Level


  


  141 mmol/L


(136-145) 


  


 


 


Potassium Level


  


  3.4 mmol/L


(3.5-5.1) 


  


 


 


Chloride Level


  


  98 mmol/L


() 


  


 


 


Carbon Dioxide Level


  


  37 mmol/L


(21-32) 


  


 


 


Anion Gap  6 (6-14)   


 


Blood Urea Nitrogen  9 mg/dL (7-20)   


 


Creatinine


  


  0.7 mg/dL


(0.6-1.0) 


  


 


 


Estimated GFR


(Cockcroft-Gault) 


  84.2 


  


  


 


 


Glucose Level


  


  137 mg/dL


(70-99) 


  


 


 


Calcium Level


  


  9.0 mg/dL


(8.5-10.1) 


  


 


 


Troponin I Quantitative


  


  0.043 ng/mL


(0.000-0.055) 


  


 


 


Triglycerides Level


  


  138 mg/dL


(0-150) 


  


 


 


Cholesterol Level


  


  153 mg/dL


(0-200) 


  


 


 


LDL Cholesterol, Calculated


  


  79 mg/dL


(0-100) 


  


 


 


VLDL Cholesterol, Calculated


  


  28 mg/dL


(0-40) 


  


 


 


Non-HDL Cholesterol Calculated


  


  107 mg/dL


(0-129) 


  


 


 


HDL Cholesterol


  


  46 mg/dL


(40-60) 


  


 


 


Cholesterol/HDL Ratio  3.3   








Laboratory Tests








Test


  12/17/17


11:36 12/17/17


16:20 12/17/17


21:02 12/18/17


03:50


 


Glucose (Fingerstick)


  201 mg/dL


(70-99) 133 mg/dL


(70-99) 172 mg/dL


(70-99) 


 


 


Sodium Level


  


  


  


  141 mmol/L


(136-145)


 


Potassium Level


  


  


  


  3.4 mmol/L


(3.5-5.1)


 


Chloride Level


  


  


  


  98 mmol/L


()


 


Carbon Dioxide Level


  


  


  


  37 mmol/L


(21-32)


 


Anion Gap    6 (6-14) 


 


Blood Urea Nitrogen    9 mg/dL (7-20) 


 


Creatinine


  


  


  


  0.7 mg/dL


(0.6-1.0)


 


Estimated GFR


(Cockcroft-Gault) 


  


  


  84.2 


 


 


Glucose Level


  


  


  


  137 mg/dL


(70-99)


 


Calcium Level


  


  


  


  9.0 mg/dL


(8.5-10.1)


 


Troponin I Quantitative


  


  


  


  0.043 ng/mL


(0.000-0.055)


 


Triglycerides Level


  


  


  


  138 mg/dL


(0-150)


 


Cholesterol Level


  


  


  


  153 mg/dL


(0-200)


 


LDL Cholesterol, Calculated


  


  


  


  79 mg/dL


(0-100)


 


VLDL Cholesterol, Calculated


  


  


  


  28 mg/dL


(0-40)


 


Non-HDL Cholesterol Calculated


  


  


  


  107 mg/dL


(0-129)


 


HDL Cholesterol


  


  


  


  46 mg/dL


(40-60)


 


Cholesterol/HDL Ratio    3.3 


 


Test


  12/18/17


07:25 


  


  


 


 


Glucose (Fingerstick)


  139 mg/dL


(70-99) 


  


  


 








Medications





Current Medications


Albuterol/ Ipratropium (Duoneb) 3 ml 1X  ONCE NEB  Last administered on 12/16/ 17at 13:59;  Start 12/16/17 at 14:00;  Stop 12/16/17 at 14:01;  Status DC


Ondansetron HCl (Zofran) 4 mg STK-MED ONCE .ROUTE ;  Start 12/16/17 at 14:31;  

Stop 12/16/17 at 14:32;  Status DC


Furosemide (Lasix) 40 mg 1X  ONCE IVP  Last administered on 12/16/17at 14:40;  

Start 12/16/17 at 14:45;  Stop 12/16/17 at 14:46;  Status DC


Ondansetron HCl (Zofran) 4 mg 1X  ONCE IV  Last administered on 12/16/17at 14:37

;  Start 12/16/17 at 14:45;  Stop 12/16/17 at 14:46;  Status DC


Iohexol (Omnipaque 300 Mg/ml) 75 ml 1X  ONCE IV  Last administered on 12/16/ 17at 15:17;  Start 12/16/17 at 15:15;  Stop 12/16/17 at 15:16;  Status DC


Ondansetron HCl (Zofran) 4 mg PRN Q8HRS  PRN IV NAUSEA/VOMITING;  Start 12/16/ 17 at 15:45;  Stop 12/17/17 at 15:44;  Status DC


Morphine Sulfate 2 mg PRN Q2HR  PRN IV PAIN;  Start 12/16/17 at 15:45;  Stop 12/ 17/17 at 15:44;  Status DC


Aspirin (Children'S Aspirin) 324 mg 1X  ONCE PO  Last administered on 12/16/ 17at 16:03;  Start 12/16/17 at 16:00;  Stop 12/16/17 at 16:01;  Status DC


Aspirin (Children'S Aspirin) 81 mg DAILY08 PO  Last administered on 12/18/17at 

09:02;  Start 12/17/17 at 08:00


Vitamin D (Vitamin D3) 1,000 unit DAILY PO  Last administered on 12/18/17at 09:

02;  Start 12/17/17 at 09:00


Acetaminophen/ Hydrocodone Bitart (Lortab 5/325) 1 tab PRN Q6HRS  PRN PO PAIN 

Last administered on 12/17/17at 22:59;  Start 12/16/17 at 18:30


Metformin HCl (Glucophage Xr) 1,000 mg BIDAC PO ;  Start 12/17/17 at 07:30;  

Stop 12/17/17 at 08:25;  Status DC


Nitrofurantoin Macrocrystals (Macrobid) 100 mg DAILY PO  Last administered on 12 /18/17at 09:02;  Start 12/17/17 at 09:00


Simvastatin (Zocor) 40 mg QHS PO  Last administered on 12/17/17at 22:58;  Start 

12/16/17 at 21:00


Duloxetine HCl (Cymbalta) 60 mg DAILY PO  Last administered on 12/18/17at 09:03

;  Start 12/17/17 at 09:00


Lactobacillus Rhamnosus (Culturelle) 1 cap DAILY PO  Last administered on 12/18/ 17at 09:01;  Start 12/17/17 at 09:00


Losartan Potassium (Cozaar) 100 mg DAILY PO  Last administered on 12/18/17at 09:

02;  Start 12/17/17 at 09:00


Metoprolol Tartrate (Lopressor) 100 mg BID PO  Last administered on 12/18/17at 

09:01;  Start 12/16/17 at 21:00


Oxybutynin Chloride (Ditropan) 5 mg [id number] PO  Last administered on 12/18/17at 

09:00;  Start 12/16/17 at 21:00


Nifedipine (Procardia Xl) 90 mg DAILY PO  Last administered on 12/18/17at 09:01

;  Start 12/17/17 at 09:00


Pantoprazole Sodium (Protonix) 40 mg DAILYAC PO  Last administered on 12/18/ 17at 09:00;  Start 12/17/17 at 07:30


Furosemide (Lasix) 40 mg [id number] IVP  Last administered on 12/18/17at 09:02;  

Start 12/17/17 at 09:00


Enoxaparin Sodium (Lovenox Per Pharmacy Prophylaxis Dosing) 1 each PRN DAILY  

PRN MC SEE COMMENTS;  Start 12/16/17 at 18:30


Enoxaparin Sodium (Lovenox 40mg Syringe) 40 mg DAILY SQ  Last administered on 12 /18/17at 09:03;  Start 12/17/17 at 09:00


Insulin Aspart (NovoLOG) 0-7 UNITS TIDWMEALS SQ  Last administered on 12/17/ 17at 12:09;  Start 12/17/17 at 08:00


Dextrose (Dextrose 50%-Water Syringe) 12.5 gm PRN Q15MIN  PRN IV SEE COMMENTS;  

Start 12/16/17 at 18:45


Furosemide (Lasix) 20 mg 1X  ONCE IVP ;  Start 12/16/17 at 20:30;  Stop 12/16/ 17 at 20:37;  Status DC


Phytonadione (Vitamin K Ampule) 5 mg 1X  ONCE SQ ;  Start 12/16/17 at 20:30;  

Stop 12/16/17 at 20:37;  Status DC


Guaifenesin (Robitussin Dm) 10 ml PRN Q6HRS  PRN PO COUGH;  Start 12/17/17 at 08

:15


Albuterol Sulfate (Ventolin Neb Soln) 2.5 mg PRN Q4HRS  PRN NEB SHORTNESS OF 

BREATH;  Start 12/17/17 at 08:15


Metformin HCl (Glucophage Xr) 1,000 mg BIDAC PO ;  Start 12/18/17 at 16:30


Info (Do NOT chart on this entry -- for MONITORING) 1 each PRN DAILY  PRN MC 

SEE COMMENTS;  Start 12/17/17 at 08:30;  Stop 12/18/17 at 15:15


Clonidine HCl (Catapres) 0.1 mg PRN Q1HR  PRN PO HYPERTENSION, SEE COMMENTS;  

Start 12/17/17 at 21:45





Active Scripts


Active


Reported


[potassium OTC]     


Hydrocodone-Apap 5-325  ** (Hydrocodone Bit/Acetaminophen) 1 Each Tablet 1 Tab 

PO PRN Q6HRS PRN


Macrobid 100 Mg Capsule (Nitrofurantoin Monohyd/M-Cryst) 100 Mg Capsule 100 Mg 

PO DAILY


Probiotic (Lactobacillus Combo No.11) 1 Each Cap.sprink 1 Each PO DAILY


Simvastatin 40 Mg Tablet 1 Tab PO QHS


Cymbalta (Duloxetine Hcl) 60 Mg Capsule.dr 1 Cap PO DAILY


Metformin Hcl Er (Metformin Hcl) 500 Mg Tab.er.24h 2 Tab PO BIDAC


Metoprolol Tartrate 100 Mg Tablet 1 Tab PO BID


Losartan Potassium 100 Mg Tablet 100 Mg PO DAILY


Lasix (Furosemide) 20 Mg Tablet 3 Tab PO DAILY


Omeprazole 40 Mg Capsule.dr 1 Cap PO DAILY


Myrbetriq (Mirabegron) 50 Mg Tab.er.24h 50 Mg PO DAILY


Procardia Xl (Nifedipine) 90 Mg Tab.er.24 1 Tab PO DAILY


Aspirin 81 Mg Tab.chew 1 Tab PO DAILY


Vitamin D3 (Cholecalciferol (Vitamin D3)) 1,000 Unit Tablet 1 Tab PO DAILY


Zyrtec (Cetirizine Hcl) 10 Mg Capsule 10 Mg PO


Vitals/I & O





Vital Sign - Last 24 Hours








 12/17/17 12/17/17 12/17/17 12/17/17





 10:42 11:44 12:05 14:45


 


Temp  96.6  96.8





  96.6  96.8


 


Pulse  69 69 71


 


Resp 28 20  20


 


B/P (MAP)  156/75 (102) 156/75 141/69 (93)


 


Pulse Ox 91 86  92


 


O2 Delivery Nasal Cannula Room Air  Nasal Cannula


 


O2 Flow Rate 13.0   13.0


 


    





    





 12/17/17 12/17/17 12/17/17 12/17/17





 19:00 20:14 22:58 22:59


 


Temp 98.1   





 98.1   


 


Pulse 74  74 


 


Resp 18   18


 


B/P (MAP) 133/64 (87)  133/64 


 


Pulse Ox 93   93


 


O2 Delivery  Nasal Cannula  Nasal Cannula


 


O2 Flow Rate  13.0  13.0


 


    





    





 12/17/17 12/18/17 12/18/17 12/18/17





 23:00 00:09 03:00 07:00


 


Temp 98.0  97.7 97.7





 98.0  97.7 97.7


 


Pulse 68  62 60


 


Resp 18 18 16 17


 


B/P (MAP) 126/65 (85)  138/70 (92) 140/63 (88)


 


Pulse Ox 92 93 92 94


 


O2 Delivery  Nasal Cannula  Nasal Cannula


 


O2 Flow Rate  13.0  10.0


 


    





    





 12/18/17 12/18/17 12/18/17 





 09:01 09:01 09:02 


 


Pulse 60 60 60 


 


B/P (MAP) 140/63 140/63 140/63 














Intake and Output   


 


 12/17/17 12/17/17 12/18/17





 15:00 23:00 07:00


 


Intake Total 600 ml 360 ml 120 ml


 


Output Total   750 ml


 


Balance 600 ml 360 ml -630 ml

















CHATO ZHENG MD Dec 18, 2017 10:16

## 2017-12-18 NOTE — PDOC
PULMONARY PROGRESS NOTES


Subjective


feels better


wants to go home


Vitals





Vital Signs








  Date Time  Temp Pulse Resp B/P (MAP) Pulse Ox O2 Delivery O2 Flow Rate FiO2


 


12/18/17 07:00 97.7 60 17 140/63 (88) 94 Nasal Cannula 10.0 





 97.7       








ROS:  No Nausea, No Chest Pain, No Abdominal Pain, No Increase Cough


General:  Alert, Oriented X4, No acute distress


Lungs:  Clear


Cardiovascular:  S1


Abdomen:  Soft


Neuro Exam:  Alert


Extremities:  Other (trace edema)


Skin:  Warm


Labs





Laboratory Tests








Test


  12/16/17


13:55 12/16/17


16:04 12/16/17


21:10 12/17/17


03:30


 


White Blood Count


  12.3 x10^3/uL


(4.0-11.0) 


  


  10.6 x10^3/uL


(4.0-11.0)


 


Red Blood Count


  4.67 x10^6/uL


(3.50-5.40) 


  


  4.37 x10^6/uL


(3.50-5.40)


 


Hemoglobin


  13.6 g/dL


(12.0-15.5) 


  


  12.7 g/dL


(12.0-15.5)


 


Hematocrit


  41.4 %


(36.0-47.0) 


  


  38.7 %


(36.0-47.0)


 


Mean Corpuscular Volume 89 fL ()    89 fL () 


 


Mean Corpuscular Hemoglobin 29 pg (25-35)    29 pg (25-35) 


 


Mean Corpuscular Hemoglobin


Concent 33 g/dL


(31-37) 


  


  33 g/dL


(31-37)


 


Red Cell Distribution Width


  14.1 %


(11.5-14.5) 


  


  14.7 %


(11.5-14.5)


 


Platelet Count


  330 x10^3/uL


(140-400) 


  


  310 x10^3/uL


(140-400)


 


Neutrophils (%) (Auto) 77 % (31-73)    80 % (31-73) 


 


Lymphocytes (%) (Auto) 15 % (24-48)    14 % (24-48) 


 


Monocytes (%) (Auto) 7 % (0-9)    6 % (0-9) 


 


Eosinophils (%) (Auto) 0 % (0-3)    1 % (0-3) 


 


Basophils (%) (Auto) 1 % (0-3)    0 % (0-3) 


 


Neutrophils # (Auto)


  9.5 x10^3uL


(1.8-7.7) 


  


  8.4 x10^3uL


(1.8-7.7)


 


Lymphocytes # (Auto)


  1.8 x10^3/uL


(1.0-4.8) 


  


  1.4 x10^3/uL


(1.0-4.8)


 


Monocytes # (Auto)


  0.8 x10^3/uL


(0.0-1.1) 


  


  0.6 x10^3/uL


(0.0-1.1)


 


Eosinophils # (Auto)


  0.0 x10^3/uL


(0.0-0.7) 


  


  0.1 x10^3/uL


(0.0-0.7)


 


Basophils # (Auto)


  0.1 x10^3/uL


(0.0-0.2) 


  


  0.0 x10^3/uL


(0.0-0.2)


 


Sodium Level


  140 mmol/L


(136-145) 


  


  141 mmol/L


(136-145)


 


Potassium Level


  4.6 mmol/L


(3.5-5.1) 


  


  4.0 mmol/L


(3.5-5.1)


 


Chloride Level


  100 mmol/L


() 


  


  99 mmol/L


()


 


Carbon Dioxide Level


  30 mmol/L


(21-32) 


  


  37 mmol/L


(21-32)


 


Anion Gap 10 (6-14)    5 (6-14) 


 


Blood Urea Nitrogen


  12 mg/dL


(7-20) 


  


  11 mg/dL


(7-20)


 


Creatinine


  0.9 mg/dL


(0.6-1.0) 


  


  0.7 mg/dL


(0.6-1.0)


 


Estimated GFR


(Cockcroft-Gault) 63.0 


  


  


  84.2 


 


 


Glucose Level


  195 mg/dL


(70-99) 


  


  170 mg/dL


(70-99)


 


Calcium Level


  8.5 mg/dL


(8.5-10.1) 


  


  8.5 mg/dL


(8.5-10.1)


 


Total Bilirubin


  0.6 mg/dL


(0.2-1.0) 


  


  


 


 


Direct Bilirubin


  0.1 mg/dL


(0.0-0.2) 


  


  


 


 


Aspartate Amino Transf


(AST/SGOT) 30 U/L (15-37) 


  


  


  


 


 


Alanine Aminotransferase


(ALT/SGPT) 24 U/L (14-59) 


  


  


  


 


 


Alkaline Phosphatase


  111 U/L


() 


  


  


 


 


Troponin I Quantitative


  0.072 ng/mL


(0.000-0.055) 


  0.139 ng/mL


(0.000-0.055) 0.101 ng/mL


(0.000-0.055)


 


NT-Pro-B-Type Natriuretic


Peptide 2221 pg/mL


(0-124) 


  


  


 


 


Total Protein


  8.1 g/dL


(6.4-8.2) 


  


  


 


 


Albumin


  3.5 g/dL


(3.4-5.0) 


  


  


 


 


Lipase


  78 U/L


() 


  


  


 


 


Nasal Screen MRSA (PCR)


  


  Negative


(Negative) 


  


 


 


Hemoglobin A1c


  


  


  


  7.1 %


(4.8-5.6)


 


Test


  12/17/17


07:54 12/17/17


09:30 12/17/17


11:36 12/17/17


16:20


 


Glucose (Fingerstick)


  149 mg/dL


(70-99) 


  201 mg/dL


(70-99) 133 mg/dL


(70-99)


 


Clostridium difficile Toxin


(PCR) 


  Negative


(Negative) 


  


 


 


Test


  12/17/17


21:02 12/18/17


03:50 12/18/17


07:25 


 


 


Glucose (Fingerstick)


  172 mg/dL


(70-99) 


  139 mg/dL


(70-99) 


 


 


Sodium Level


  


  141 mmol/L


(136-145) 


  


 


 


Potassium Level


  


  3.4 mmol/L


(3.5-5.1) 


  


 


 


Chloride Level


  


  98 mmol/L


() 


  


 


 


Carbon Dioxide Level


  


  37 mmol/L


(21-32) 


  


 


 


Anion Gap  6 (6-14)   


 


Blood Urea Nitrogen  9 mg/dL (7-20)   


 


Creatinine


  


  0.7 mg/dL


(0.6-1.0) 


  


 


 


Estimated GFR


(Cockcroft-Gault) 


  84.2 


  


  


 


 


Glucose Level


  


  137 mg/dL


(70-99) 


  


 


 


Calcium Level


  


  9.0 mg/dL


(8.5-10.1) 


  


 


 


Troponin I Quantitative


  


  0.043 ng/mL


(0.000-0.055) 


  


 


 


Triglycerides Level


  


  138 mg/dL


(0-150) 


  


 


 


Cholesterol Level


  


  153 mg/dL


(0-200) 


  


 


 


LDL Cholesterol, Calculated


  


  79 mg/dL


(0-100) 


  


 


 


VLDL Cholesterol, Calculated


  


  28 mg/dL


(0-40) 


  


 


 


Non-HDL Cholesterol Calculated


  


  107 mg/dL


(0-129) 


  


 


 


HDL Cholesterol


  


  46 mg/dL


(40-60) 


  


 


 


Cholesterol/HDL Ratio  3.3   








Laboratory Tests








Test


  12/17/17


09:30 12/17/17


11:36 12/17/17


16:20 12/17/17


21:02


 


Clostridium difficile Toxin


(PCR) Negative


(Negative) 


  


  


 


 


Glucose (Fingerstick)


  


  201 mg/dL


(70-99) 133 mg/dL


(70-99) 172 mg/dL


(70-99)


 


Test


  12/18/17


03:50 12/18/17


07:25 


  


 


 


Sodium Level


  141 mmol/L


(136-145) 


  


  


 


 


Potassium Level


  3.4 mmol/L


(3.5-5.1) 


  


  


 


 


Chloride Level


  98 mmol/L


() 


  


  


 


 


Carbon Dioxide Level


  37 mmol/L


(21-32) 


  


  


 


 


Anion Gap 6 (6-14)    


 


Blood Urea Nitrogen 9 mg/dL (7-20)    


 


Creatinine


  0.7 mg/dL


(0.6-1.0) 


  


  


 


 


Estimated GFR


(Cockcroft-Gault) 84.2 


  


  


  


 


 


Glucose Level


  137 mg/dL


(70-99) 


  


  


 


 


Calcium Level


  9.0 mg/dL


(8.5-10.1) 


  


  


 


 


Troponin I Quantitative


  0.043 ng/mL


(0.000-0.055) 


  


  


 


 


Triglycerides Level


  138 mg/dL


(0-150) 


  


  


 


 


Cholesterol Level


  153 mg/dL


(0-200) 


  


  


 


 


LDL Cholesterol, Calculated


  79 mg/dL


(0-100) 


  


  


 


 


VLDL Cholesterol, Calculated


  28 mg/dL


(0-40) 


  


  


 


 


Non-HDL Cholesterol Calculated


  107 mg/dL


(0-129) 


  


  


 


 


HDL Cholesterol


  46 mg/dL


(40-60) 


  


  


 


 


Cholesterol/HDL Ratio 3.3    


 


Glucose (Fingerstick)


  


  139 mg/dL


(70-99) 


  


 








Medications





Active Scripts








 Medications  Dose


 Route/Sig


 Max Daily Dose Days Date Category


 


 [potassium OTC]    


 


    12/16/17 Reported


 


 Hydrocodone-Apap


 5-325  **


  (Hydrocodone


 Bit/Acetaminophen)


 1 Each Tablet  1 Tab


 PO PRN Q6HRS PRN


    12/16/17 Reported


 


 Macrobid 100 Mg


 Capsule


  (Nitrofurantoin


 Monohyd/M-Cryst)


 100 Mg Capsule  100 Mg


 PO DAILY


    12/16/17 Reported


 


 Probiotic


  (Lactobacillus


 Combo No.11) 1


 Each Cap.sprink  1 Each


 PO DAILY


    12/16/17 Reported


 


 Simvastatin 40 Mg


 Tablet  1 Tab


 PO QHS


    12/16/17 Reported


 


 Cymbalta


  (Duloxetine Hcl)


 60 Mg Capsule.dr  1 Cap


 PO DAILY


    12/16/17 Reported


 


 Metformin Hcl Er


  (Metformin Hcl)


 500 Mg Tab.er.24h  2 Tab


 PO BIDAC


    12/16/17 Reported


 


 Metoprolol


 Tartrate 100 Mg


 Tablet  1 Tab


 PO BID


    12/16/17 Reported


 


 Losartan


 Potassium 100 Mg


 Tablet  100 Mg


 PO DAILY


    12/16/17 Reported


 


 Lasix


  (Furosemide) 20


 Mg Tablet  3 Tab


 PO DAILY


    12/16/17 Reported


 


 Omeprazole 40 Mg


 Capsule.dr  1 Cap


 PO DAILY


    12/16/17 Reported


 


 Myrbetriq


  (Mirabegron) 50


 Mg Tab.er.24h  50 Mg


 PO DAILY


    12/16/17 Reported


 


 Procardia Xl


  (Nifedipine) 90


 Mg Tab.er.24  1 Tab


 PO DAILY


    12/16/17 Reported


 


 Aspirin 81 Mg


 Tab.chew  1 Tab


 PO DAILY


    12/16/17 Reported


 


 Vitamin D3


  (Cholecalciferol


  (Vitamin D3))


 1,000 Unit Tablet  1 Tab


 PO DAILY


    12/16/17 Reported


 


 Zyrtec


  (Cetirizine Hcl)


 10 Mg Capsule  10 Mg


 PO


    12/16/17 Reported











Impression


.


1.  Acute hypoxic respiratory failure secondary to most likely congestive heart


failure.


2.  Clinically, less likely pneumonia.


3.  Abnormal CT chest and findings are more consistent with congestive heart


failure rather than pneumonia.


4.  No significant history of tobacco use.





Plan


.





1.  Continue with present oxygen.


2.  Continue diuresis.


3.  Follow chest x-ray as needed


4.  Wean FiO2 once saturation stays above 94%.


5.  Bronchodilators p.r.n.


6.  Echocardiogram.


7.  dc plans per cardiology











DARIUS SHEPARD MD Dec 18, 2017 08:38

## 2017-12-19 VITALS — DIASTOLIC BLOOD PRESSURE: 71 MMHG | SYSTOLIC BLOOD PRESSURE: 129 MMHG

## 2017-12-19 VITALS — DIASTOLIC BLOOD PRESSURE: 69 MMHG | SYSTOLIC BLOOD PRESSURE: 135 MMHG

## 2017-12-19 VITALS — DIASTOLIC BLOOD PRESSURE: 53 MMHG | SYSTOLIC BLOOD PRESSURE: 118 MMHG

## 2017-12-19 VITALS — DIASTOLIC BLOOD PRESSURE: 62 MMHG | SYSTOLIC BLOOD PRESSURE: 120 MMHG

## 2017-12-19 RX ADMIN — ASPIRIN 81 MG SCH MG: 81 TABLET ORAL at 10:03

## 2017-12-19 RX ADMIN — LOSARTAN POTASSIUM SCH MG: 50 TABLET ORAL at 10:05

## 2017-12-19 RX ADMIN — ENOXAPARIN SODIUM SCH MG: 40 INJECTION SUBCUTANEOUS at 12:53

## 2017-12-19 RX ADMIN — VITAMIN D, TAB 1000IU (100/BT) SCH UNIT: 25 TAB at 10:04

## 2017-12-19 RX ADMIN — FUROSEMIDE SCH MG: 40 TABLET ORAL at 10:05

## 2017-12-19 RX ADMIN — Medication SCH CAP: at 10:05

## 2017-12-19 RX ADMIN — FUROSEMIDE SCH MG: 40 TABLET ORAL at 12:53

## 2017-12-19 RX ADMIN — INSULIN ASPART SCH UNITS: 100 INJECTION, SOLUTION INTRAVENOUS; SUBCUTANEOUS at 12:00

## 2017-12-19 RX ADMIN — INSULIN ASPART SCH UNITS: 100 INJECTION, SOLUTION INTRAVENOUS; SUBCUTANEOUS at 07:54

## 2017-12-19 RX ADMIN — OXYBUTYNIN CHLORIDE SCH MG: 5 TABLET ORAL at 15:26

## 2017-12-19 RX ADMIN — NITROFURANTOIN (MONOHYDRATE/MACROCRYSTALS) SCH MG: 75; 25 CAPSULE ORAL at 10:04

## 2017-12-19 RX ADMIN — PANTOPRAZOLE SODIUM SCH MG: 40 TABLET, DELAYED RELEASE ORAL at 10:03

## 2017-12-19 RX ADMIN — OXYBUTYNIN CHLORIDE SCH MG: 5 TABLET ORAL at 10:03

## 2017-12-19 RX ADMIN — METOPROLOL TARTRATE SCH MG: 50 TABLET, FILM COATED ORAL at 10:04

## 2017-12-19 NOTE — PDOC
PULMONARY PROGRESS NOTES


Subjective


pt feels better


Vitals





Vital Signs








  Date Time  Temp Pulse Resp B/P (MAP) Pulse Ox O2 Delivery O2 Flow Rate FiO2


 


12/19/17 11:00  65 18 118/53 (74) 96 Nasal Cannula 6.0 


 


12/19/17 07:00 97.7       





 97.7       








ROS:  No Nausea, No Chest Pain, No Abdominal Pain, No Increase Cough


General:  Alert, Oriented X4, No acute distress


Lungs:  Clear, Other (good vol)


Cardiovascular:  S1


Abdomen:  Soft


Neuro Exam:  Alert


Extremities:  Other (trace edema)


Skin:  Warm


Labs





Laboratory Tests








Test


  12/17/17


16:20 12/17/17


21:02 12/18/17


03:50 12/18/17


07:25


 


Glucose (Fingerstick)


  133 mg/dL


(70-99) 172 mg/dL


(70-99) 


  139 mg/dL


(70-99)


 


Sodium Level


  


  


  141 mmol/L


(136-145) 


 


 


Potassium Level


  


  


  3.4 mmol/L


(3.5-5.1) 


 


 


Chloride Level


  


  


  98 mmol/L


() 


 


 


Carbon Dioxide Level


  


  


  37 mmol/L


(21-32) 


 


 


Anion Gap   6 (6-14)  


 


Blood Urea Nitrogen   9 mg/dL (7-20)  


 


Creatinine


  


  


  0.7 mg/dL


(0.6-1.0) 


 


 


Estimated GFR


(Cockcroft-Gault) 


  


  84.2 


  


 


 


Glucose Level


  


  


  137 mg/dL


(70-99) 


 


 


Calcium Level


  


  


  9.0 mg/dL


(8.5-10.1) 


 


 


Troponin I Quantitative


  


  


  0.043 ng/mL


(0.000-0.055) 


 


 


Triglycerides Level


  


  


  138 mg/dL


(0-150) 


 


 


Cholesterol Level


  


  


  153 mg/dL


(0-200) 


 


 


LDL Cholesterol, Calculated


  


  


  79 mg/dL


(0-100) 


 


 


VLDL Cholesterol, Calculated


  


  


  28 mg/dL


(0-40) 


 


 


Non-HDL Cholesterol Calculated


  


  


  107 mg/dL


(0-129) 


 


 


HDL Cholesterol


  


  


  46 mg/dL


(40-60) 


 


 


Cholesterol/HDL Ratio   3.3  


 


Test


  12/18/17


11:11 12/18/17


16:29 12/18/17


20:27 12/19/17


07:16


 


Glucose (Fingerstick)


  146 mg/dL


(70-99) 139 mg/dL


(70-99) 133 mg/dL


(70-99) 161 mg/dL


(70-99)


 


Test


  12/19/17


11:25 


  


  


 


 


Glucose (Fingerstick)


  150 mg/dL


(70-99) 


  


  


 








Laboratory Tests








Test


  12/18/17


16:29 12/18/17


20:27 12/19/17


07:16 12/19/17


11:25


 


Glucose (Fingerstick)


  139 mg/dL


(70-99) 133 mg/dL


(70-99) 161 mg/dL


(70-99) 150 mg/dL


(70-99)








Medications





Active Scripts








 Medications  Dose


 Route/Sig


 Max Daily Dose Days Date Category


 


 [potassium OTC]    


 


    12/16/17 Reported


 


 Hydrocodone-Apap


 5-325  **


  (Hydrocodone


 Bit/Acetaminophen)


 1 Each Tablet  1 Tab


 PO PRN Q6HRS PRN


    12/16/17 Reported


 


 Macrobid 100 Mg


 Capsule


  (Nitrofurantoin


 Monohyd/M-Cryst)


 100 Mg Capsule  100 Mg


 PO DAILY


    12/16/17 Reported


 


 Probiotic


  (Lactobacillus


 Combo No.11) 1


 Each Cap.sprink  1 Each


 PO DAILY


    12/16/17 Reported


 


 Simvastatin 40 Mg


 Tablet  1 Tab


 PO QHS


    12/16/17 Reported


 


 Cymbalta


  (Duloxetine Hcl)


 60 Mg Capsule.dr  1 Cap


 PO DAILY


    12/16/17 Reported


 


 Metformin Hcl Er


  (Metformin Hcl)


 500 Mg Tab.er.24h  2 Tab


 PO BIDAC


    12/16/17 Reported


 


 Metoprolol


 Tartrate 100 Mg


 Tablet  1 Tab


 PO BID


    12/16/17 Reported


 


 Losartan


 Potassium 100 Mg


 Tablet  100 Mg


 PO DAILY


    12/16/17 Reported


 


 Lasix


  (Furosemide) 20


 Mg Tablet  3 Tab


 PO DAILY


    12/16/17 Reported


 


 Omeprazole 40 Mg


 Capsule.dr  1 Cap


 PO DAILY


    12/16/17 Reported


 


 Myrbetriq


  (Mirabegron) 50


 Mg Tab.er.24h  50 Mg


 PO DAILY


    12/16/17 Reported


 


 Procardia Xl


  (Nifedipine) 90


 Mg Tab.er.24  1 Tab


 PO DAILY


    12/16/17 Reported


 


 Aspirin 81 Mg


 Tab.chew  1 Tab


 PO DAILY


    12/16/17 Reported


 


 Vitamin D3


  (Cholecalciferol


  (Vitamin D3))


 1,000 Unit Tablet  1 Tab


 PO DAILY


    12/16/17 Reported


 


 Zyrtec


  (Cetirizine Hcl)


 10 Mg Capsule  10 Mg


 PO


    12/16/17 Reported











Impression


.


1.  Acute hypoxic respiratory failure 


2.  acute CHF


3   ILD/FIBROSIS


4.  No significant history of tobacco use.\


5.  ATYPICAL PNEUMONIA





Plan


.


D/C HOME ON DOXY


FOLLOW UP WITH HOME PULMONOLOGIS


02 AT BASELINE











ALLIE COPELAND MD Dec 19, 2017 14:21

## 2017-12-19 NOTE — PDOC
PROGRESS NOTES


Chief Complaint


Chief Complaint


acute on chronic hypoxic respiratory failure,  on 3 liters at baseline at home


Interstitial lung disease


CHF acute on chronic diastolic failure clinically,   this is not supported by 

Echo results


Obesity, BMI 38


HTN


Dm2


CAD





History of Present Illness


History of Present Illness


Patient seen and examined. No acute events overnight. Patient states she is 

breathing better, now on 5L of oxygen. Patient is on 3L at baseline. Denies 

chest pain, fevers/chills, nausea/vomiting.





Vitals


Vitals





Vital Signs








  Date Time  Temp Pulse Resp B/P (MAP) Pulse Ox O2 Delivery O2 Flow Rate FiO2


 


12/19/17 10:05  65  129/71    


 


12/19/17 07:00 97.7  18  95 Nasal Cannula 6.0 





 97.7       











Physical Exam


General:  Alert, Oriented X3, Cooperative, No acute distress


Heart:  Regular rate


Lungs:  Clear, Other (good vol)


Abdomen:  Normal bowel sounds


Extremities:  No clubbing, Normal pulses, Other (2+ LE edema pedal )


Skin:  No rashes, No breakdown





Labs


LABS





Laboratory Tests








Test


  12/18/17


11:11 12/18/17


16:29 12/18/17


20:27 12/19/17


07:16


 


Glucose (Fingerstick)


  146 mg/dL


(70-99) 139 mg/dL


(70-99) 133 mg/dL


(70-99) 161 mg/dL


(70-99)











Review of Systems


Review of Systems


Patient states shortness of breath improving. Denies fever/chills, nausea/

vomiting.





Assessment and Plan


Assessmemt and Plan


Problems


Medical Problems:


(1) SOB (shortness of breath)


Status: Acute  





acute on chronic hypoxic respiratory failure,  on 3 liters at baseline at home


Interstitial lung disease


CHF acute on chronic diastolic failure clinically,   this is not supported by 

Echo results


Obesity, BMI 38


HTN


Dm2


CAD





Plan: 


Albuterol 


5L O2, taper to baseline as tolerated 


Lasix 


Continue home medications 


PRN Narcotics 


PT/OT


Possible discharge today if okay with subspecialties


Problems:  





Comment


Review of Relevant


I have reviewed the following items vika (where applicable) has been applied.


Labs





Laboratory Tests








Test


  12/17/17


11:36 12/17/17


16:20 12/17/17


21:02 12/18/17


03:50


 


Glucose (Fingerstick)


  201 mg/dL


(70-99) 133 mg/dL


(70-99) 172 mg/dL


(70-99) 


 


 


Sodium Level


  


  


  


  141 mmol/L


(136-145)


 


Potassium Level


  


  


  


  3.4 mmol/L


(3.5-5.1)


 


Chloride Level


  


  


  


  98 mmol/L


()


 


Carbon Dioxide Level


  


  


  


  37 mmol/L


(21-32)


 


Anion Gap    6 (6-14) 


 


Blood Urea Nitrogen    9 mg/dL (7-20) 


 


Creatinine


  


  


  


  0.7 mg/dL


(0.6-1.0)


 


Estimated GFR


(Cockcroft-Gault) 


  


  


  84.2 


 


 


Glucose Level


  


  


  


  137 mg/dL


(70-99)


 


Calcium Level


  


  


  


  9.0 mg/dL


(8.5-10.1)


 


Troponin I Quantitative


  


  


  


  0.043 ng/mL


(0.000-0.055)


 


Triglycerides Level


  


  


  


  138 mg/dL


(0-150)


 


Cholesterol Level


  


  


  


  153 mg/dL


(0-200)


 


LDL Cholesterol, Calculated


  


  


  


  79 mg/dL


(0-100)


 


VLDL Cholesterol, Calculated


  


  


  


  28 mg/dL


(0-40)


 


Non-HDL Cholesterol Calculated


  


  


  


  107 mg/dL


(0-129)


 


HDL Cholesterol


  


  


  


  46 mg/dL


(40-60)


 


Cholesterol/HDL Ratio    3.3 


 


Test


  12/18/17


07:25 12/18/17


11:11 12/18/17


16:29 12/18/17


20:27


 


Glucose (Fingerstick)


  139 mg/dL


(70-99) 146 mg/dL


(70-99) 139 mg/dL


(70-99) 133 mg/dL


(70-99)


 


Test


  12/19/17


07:16 


  


  


 


 


Glucose (Fingerstick)


  161 mg/dL


(70-99) 


  


  


 








Laboratory Tests








Test


  12/18/17


11:11 12/18/17


16:29 12/18/17


20:27 12/19/17


07:16


 


Glucose (Fingerstick)


  146 mg/dL


(70-99) 139 mg/dL


(70-99) 133 mg/dL


(70-99) 161 mg/dL


(70-99)








Medications





Current Medications


Albuterol/ Ipratropium (Duoneb) 3 ml 1X  ONCE NEB  Last administered on 12/16/ 17at 13:59;  Start 12/16/17 at 14:00;  Stop 12/16/17 at 14:01;  Status DC


Ondansetron HCl (Zofran) 4 mg STK-MED ONCE .ROUTE ;  Start 12/16/17 at 14:31;  

Stop 12/16/17 at 14:32;  Status DC


Furosemide (Lasix) 40 mg 1X  ONCE IVP  Last administered on 12/16/17at 14:40;  

Start 12/16/17 at 14:45;  Stop 12/16/17 at 14:46;  Status DC


Ondansetron HCl (Zofran) 4 mg 1X  ONCE IV  Last administered on 12/16/17at 14:37

;  Start 12/16/17 at 14:45;  Stop 12/16/17 at 14:46;  Status DC


Iohexol (Omnipaque 300 Mg/ml) 75 ml 1X  ONCE IV  Last administered on 12/16/ 17at 15:17;  Start 12/16/17 at 15:15;  Stop 12/16/17 at 15:16;  Status DC


Ondansetron HCl (Zofran) 4 mg PRN Q8HRS  PRN IV NAUSEA/VOMITING;  Start 12/16/ 17 at 15:45;  Stop 12/17/17 at 15:44;  Status DC


Morphine Sulfate 2 mg PRN Q2HR  PRN IV PAIN;  Start 12/16/17 at 15:45;  Stop 12/ 17/17 at 15:44;  Status DC


Aspirin (Children'S Aspirin) 324 mg 1X  ONCE PO  Last administered on 12/16/ 17at 16:03;  Start 12/16/17 at 16:00;  Stop 12/16/17 at 16:01;  Status DC


Aspirin (Children'S Aspirin) 81 mg DAILY08 PO  Last administered on 12/19/17at 

10:03;  Start 12/17/17 at 08:00


Vitamin D (Vitamin D3) 1,000 unit DAILY PO  Last administered on 12/19/17at 10:

04;  Start 12/17/17 at 09:00


Acetaminophen/ Hydrocodone Bitart (Lortab 5/325) 1 tab PRN Q6HRS  PRN PO PAIN 

Last administered on 12/18/17at 20:55;  Start 12/16/17 at 18:30


Metformin HCl (Glucophage Xr) 1,000 mg BIDAC PO ;  Start 12/17/17 at 07:30;  

Stop 12/17/17 at 08:25;  Status DC


Nitrofurantoin Macrocrystals (Macrobid) 100 mg DAILY PO  Last administered on 12 /19/17at 10:04;  Start 12/17/17 at 09:00


Simvastatin (Zocor) 40 mg QHS PO  Last administered on 12/18/17at 20:54;  Start 

12/16/17 at 21:00


Duloxetine HCl (Cymbalta) 60 mg DAILY PO  Last administered on 12/19/17at 10:05

;  Start 12/17/17 at 09:00


Lactobacillus Rhamnosus (Culturelle) 1 cap DAILY PO  Last administered on 12/19/ 17at 10:05;  Start 12/17/17 at 09:00


Losartan Potassium (Cozaar) 100 mg DAILY PO  Last administered on 12/19/17at 10:

05;  Start 12/17/17 at 09:00


Metoprolol Tartrate (Lopressor) 100 mg BID PO  Last administered on 12/19/17at 

10:04;  Start 12/16/17 at 21:00


Oxybutynin Chloride (Ditropan) 5 mg [id number] PO  Last administered on 12/19/17at 

10:03;  Start 12/16/17 at 21:00


Nifedipine (Procardia Xl) 90 mg DAILY PO  Last administered on 12/19/17at 10:05

;  Start 12/17/17 at 09:00


Pantoprazole Sodium (Protonix) 40 mg DAILYAC PO  Last administered on 12/19/ 17at 10:03;  Start 12/17/17 at 07:30


Furosemide (Lasix) 40 mg BID92 IVP  Last administered on 12/18/17at 09:02;  

Start 12/17/17 at 09:00;  Stop 12/18/17 at 10:04;  Status DC


Enoxaparin Sodium (Lovenox Per Pharmacy Prophylaxis Dosing) 1 each PRN DAILY  

PRN MC SEE COMMENTS;  Start 12/16/17 at 18:30


Enoxaparin Sodium (Lovenox 40mg Syringe) 40 mg DAILY SQ  Last administered on 12 /18/17at 09:03;  Start 12/17/17 at 09:00


Insulin Aspart (NovoLOG) 0-7 UNITS TIDWMEALS SQ  Last administered on 12/19/ 17at 07:54;  Start 12/17/17 at 08:00


Dextrose (Dextrose 50%-Water Syringe) 12.5 gm PRN Q15MIN  PRN IV SEE COMMENTS;  

Start 12/16/17 at 18:45


Furosemide (Lasix) 20 mg 1X  ONCE IVP ;  Start 12/16/17 at 20:30;  Stop 12/16/ 17 at 20:37;  Status DC


Phytonadione (Vitamin K Ampule) 5 mg 1X  ONCE SQ ;  Start 12/16/17 at 20:30;  

Stop 12/16/17 at 20:37;  Status DC


Guaifenesin (Robitussin Dm) 10 ml PRN Q6HRS  PRN PO COUGH;  Start 12/17/17 at 08

:15


Albuterol Sulfate (Ventolin Neb Soln) 2.5 mg PRN Q4HRS  PRN NEB SHORTNESS OF 

BREATH;  Start 12/17/17 at 08:15


Metformin HCl (Glucophage Xr) 1,000 mg BIDAC PO  Last administered on 12/19/ 17at 10:04;  Start 12/18/17 at 16:30


Info (Do NOT chart on this entry -- for MONITORING) 1 each PRN DAILY  PRN MC 

SEE COMMENTS;  Start 12/17/17 at 08:30;  Stop 12/18/17 at 15:15;  Status DC


Clonidine HCl (Catapres) 0.1 mg PRN Q1HR  PRN PO HYPERTENSION, SEE COMMENTS;  

Start 12/17/17 at 21:45


Furosemide (Lasix) 40 mg BIDACBL PO  Last administered on 12/19/17at 10:05;  

Start 12/18/17 at 11:30


Potassium Chloride (Klor-Con) 40 meq 1X  ONCE PO  Last administered on 12/18/ 17at 11:58;  Start 12/18/17 at 10:30;  Stop 12/18/17 at 10:31;  Status DC





Active Scripts


Active


Reported


[potassium OTC]     


Hydrocodone-Apap 5-325  ** (Hydrocodone Bit/Acetaminophen) 1 Each Tablet 1 Tab 

PO PRN Q6HRS PRN


Macrobid 100 Mg Capsule (Nitrofurantoin Monohyd/M-Cryst) 100 Mg Capsule 100 Mg 

PO DAILY


Probiotic (Lactobacillus Combo No.11) 1 Each Cap.sprink 1 Each PO DAILY


Simvastatin 40 Mg Tablet 1 Tab PO QHS


Cymbalta (Duloxetine Hcl) 60 Mg Capsule.dr 1 Cap PO DAILY


Metformin Hcl Er (Metformin Hcl) 500 Mg Tab.er.24h 2 Tab PO BIDAC


Metoprolol Tartrate 100 Mg Tablet 1 Tab PO BID


Losartan Potassium 100 Mg Tablet 100 Mg PO DAILY


Lasix (Furosemide) 20 Mg Tablet 3 Tab PO DAILY


Omeprazole 40 Mg Capsule. 1 Cap PO DAILY


Myrbetriq (Mirabegron) 50 Mg Tab.er.24h 50 Mg PO DAILY


Procardia Xl (Nifedipine) 90 Mg Tab.er.24 1 Tab PO DAILY


Aspirin 81 Mg Tab.chew 1 Tab PO DAILY


Vitamin D3 (Cholecalciferol (Vitamin D3)) 1,000 Unit Tablet 1 Tab PO DAILY


Zyrtec (Cetirizine Hcl) 10 Mg Capsule 10 Mg PO


Vitals/I & O





Vital Sign - Last 24 Hours








 12/18/17 12/18/17 12/18/17 12/18/17





 13:12 14:45 19:00 20:06


 


Temp  98.4 97.5 





  98.4 97.5 


 


Pulse  60 68 


 


Resp  18 19 


 


B/P (MAP)  121/60 (80) 132/59 (83) 


 


Pulse Ox  93 94 


 


O2 Delivery Nasal Cannula Nasal Cannula Nasal Cannula Nasal Cannula


 


O2 Flow Rate 8.0 8.0 6.0 6.0


 


    





    





 12/18/17 12/18/17 12/18/17 12/18/17





 20:54 20:55 22:00 22:58


 


Temp    98.9





    98.9


 


Pulse 68   67


 


Resp  18 18 19


 


B/P (MAP) 132/59   133/71 (91)


 


Pulse Ox  94 93 93


 


O2 Delivery  Nasal Cannula Nasal Cannula Nasal Cannula


 


O2 Flow Rate  6.0 6.0 6.0


 


    





    





 12/19/17 12/19/17 12/19/17 12/19/17





 03:06 07:00 10:04 10:05


 


Temp 97.7 97.7  





 97.7 97.7  


 


Pulse 57 65 65 65


 


Resp 18 18  


 


B/P (MAP) 135/69 (91) 129/71 (90) 129/71 129/71


 


Pulse Ox 92 95  


 


O2 Delivery Nasal Cannula Nasal Cannula  


 


O2 Flow Rate 6.0 6.0  


 


    





    





 12/19/17   





 10:05   


 


Pulse 65   


 


B/P (MAP) 129/71   














Intake and Output   


 


 12/18/17 12/18/17 12/19/17





 15:00 23:00 07:00


 


Intake Total  360 ml 120 ml


 


Output Total   200 ml


 


Balance  360 ml -80 ml

















CASTLE,NIAL K III DO Dec 19, 2017 11:09

## 2017-12-19 NOTE — PDOC
CARDIO Progress Notes


Date and Time


Date of Service


12/19/17


Time of Evaluation


1140





Subjective


Subjective:  No Chest Pain, No shortness of breath, No Palpitations





Vitals


Vitals





Vital Signs








  Date Time  Temp Pulse Resp B/P (MAP) Pulse Ox O2 Delivery O2 Flow Rate FiO2


 


12/19/17 11:00  65 18 118/53 (74) 96 Nasal Cannula 6.0 


 


12/19/17 07:00 97.7       





 97.7       








Weight


Weight [ ]





Input and Output


Intake and Output











Intake and Output 


 


 12/19/17





 06:59


 


Intake Total 480 ml


 


Output Total 200 ml


 


Balance 280 ml


 


 


 


Intake Oral 480 ml


 


Output Urine Total 200 ml


 


# Voids 3











Laboratory


Labs





Laboratory Tests








Test


  12/18/17


16:29 12/18/17


20:27 12/19/17


07:16 12/19/17


11:25


 


Glucose (Fingerstick)


  139 mg/dL


(70-99) 133 mg/dL


(70-99) 161 mg/dL


(70-99) 150 mg/dL


(70-99)











Physical Exam


HEENT:  Neck Supple W Full Motion


Chest:  Symmetric


LUNGS:  Clear to Auscultation, Other (diminished bases)


Heart:  S1S2, RRR, murmurs (2/6 systolic murmur )


Abdomen:  Soft N/T


Extremities:  Other (trace bilateral LE edema )


Neurology:  alert, oriented, follow commands





Assessment


Assessment


1. Acute hypoxic respiratory failure secondary to a/c HF. Improved


2. Acute on chronic heart failure; LVEF preserved. improved with diuresis 


3. Mild troponin elevation; peak 0.139. Most probably type II, demand ischemia. 

No acute ischemic EKG changes. Echo without WMA


4. Hypertension; well-controlled


5. Diabetes mellitus; as per PCP











Recommendations





Continue routine oral diuresis


2Gm Na dietary restriction. Daily weight monitoring


Supportive care


May discharge from a CV standpoint and f/u with routine cardiologist in 2-3 

weeks.











JESUS ALBERTO BONDS Dec 19, 2017 12:24

## 2018-09-30 ENCOUNTER — HOSPITAL ENCOUNTER (EMERGENCY)
Dept: HOSPITAL 61 - ER | Age: 63
Discharge: HOME | End: 2018-09-30
Payer: MEDICARE

## 2018-09-30 VITALS — HEIGHT: 64 IN | WEIGHT: 210 LBS | BODY MASS INDEX: 35.85 KG/M2

## 2018-09-30 VITALS — SYSTOLIC BLOOD PRESSURE: 145 MMHG | DIASTOLIC BLOOD PRESSURE: 65 MMHG
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
## 2018-09-30 DIAGNOSIS — M47.812: ICD-10-CM
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
## 2018-09-30 DIAGNOSIS — S16.1XXA: Primary | ICD-10-CM

## 2018-09-30 DIAGNOSIS — E11.9: ICD-10-CM

## 2018-09-30 DIAGNOSIS — S00.03XA: ICD-10-CM
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
## 2018-09-30 DIAGNOSIS — I25.10: ICD-10-CM

## 2018-09-30 DIAGNOSIS — I10: ICD-10-CM

## 2018-09-30 PROCEDURE — 90715 TDAP VACCINE 7 YRS/> IM: CPT

## 2018-09-30 PROCEDURE — 72125 CT NECK SPINE W/O DYE: CPT

## 2018-09-30 PROCEDURE — 90471 IMMUNIZATION ADMIN: CPT

## 2018-09-30 PROCEDURE — 99284 EMERGENCY DEPT VISIT MOD MDM: CPT

## 2018-09-30 PROCEDURE — 93005 ELECTROCARDIOGRAM TRACING: CPT

## 2018-09-30 PROCEDURE — 70450 CT HEAD/BRAIN W/O DYE: CPT

## 2018-09-30 NOTE — PHYS DOC
Past Medical History


Past Medical History:  CAD, Diabetes-Type II, Hypertension, Other


Additional Past Medical Histor:  Brown Lung


Past Surgical History:  Appendectomy, Cholecystectomy, Tonsillectomy, Tubal 

ligation


Alcohol Use:  None


Drug Use:  None





Adult General


HPI


HPI





Patient is a 62  year old female with history of hypertension, diabetes type 2, 

who presents today status post falling. Patient states she was standing on a 18 

inch porch when she saw a bee almost get to her grandson, she states in the 

process of trying to run towards the grandson he fell back hitting her head on 

the ground. Patient denies any loss of consciousness. Patient is complaining of 

mild left occipital pain, and posterior neck pain. Patient states the pain is 

sharp. She states she has not taken anything for her pain. Patient denies being 

on any blood thinners.





Review of Systems


Review of Systems





Constitutional: Denies fever or chills []


Eyes: Denies change in visual acuity, redness, or eye pain []


HENT: Denies nasal congestion or sore throat []


Respiratory: Denies cough or shortness of breath []


Cardiovascular: No additional information not addressed in HPI []


GI: Denies abdominal pain, nausea, vomiting, bloody stools or diarrhea []


: Denies dysuria or hematuria []


Musculoskeletal: Reports posterior neck pain


Integument: Denies rash or skin lesions []


Neurologic: Reports head pain, denies focal weakness or sensory changes []








All other systems were reviewed and found to be within normal limits, except as 

documented in this note.





Current Medications


Current Medications





Current Medications








 Medications


  (Trade)  Dose


 Ordered  Sig/Lisa  Start Time


 Stop Time Status Last Admin


Dose Admin


 


 Diphtheria/


 Tetanus/Acell


 Pertussis


  (Boostrix)  0.5 ml  ONCE ONCE  9/30/18 16:00


 9/30/18 16:01 DC 9/30/18 16:22


0.5 ML


 


 Ondansetron HCl


  (Zofran Odt)  4 mg  1X  ONCE  9/30/18 16:00


 9/30/18 16:01 DC 9/30/18 16:21


4 MG











Allergies


Allergies





Allergies








Coded Allergies Type Severity Reaction Last Updated Verified


 


  No Known Drug Allergies    12/16/17 No











Physical Exam


Physical Exam





Constitutional: Well developed, well nourished, no acute distress, non-toxic 

appearance. []


HENT: Normocephalic, atraumatic, bilateral external ears normal, oropharynx 

moist, no oral exudates, nose normal. []


Eyes: PERRLA, EOMI, conjunctiva normal, no discharge. [] 


Neck: Normal range of motion, no tenderness, supple, no stridor. [] 


Cardiovascular:Heart rate regular rhythm, no murmur []


Lungs & Thorax:  Bilateral breath sounds clear to auscultation []


Abdomen: Bowel sounds normal, soft, no tenderness, no masses, no pulsatile 

masses. [] 


Skin: Warm, dry, posterior occipital with a mild contusion. There is an 

abrasion on the left elbow with no pain or tenderness to the area.


Back: No tenderness, no CVA tenderness. [] 


Extremities: No tenderness, no cyanosis, no clubbing, ROM intact, no edema. [] 


Neurologic: Alert and oriented X 3, normal motor function, normal sensory 

function, no focal deficits noted. Cranial nerves II through XII intact.


Psychologic: Affect normal, judgement normal, mood normal. []





Current Patient Data


Vital Signs





 Vital Signs








  Date Time  Temp Pulse Resp B/P (MAP) Pulse Ox O2 Delivery O2 Flow Rate FiO2


 


9/30/18 15:43 98.1 69 20 142/67 (92) 98 Nasal Cannula 3.0 





 98.1       











EKG


EKG


[]





Radiology/Procedures


Radiology/Procedures


[]PROCEDURE: CT HEAD AND CERVICAL SPINE WO





Examination: CT head and cervical spine without contrast


 


HISTORY: History of headache, neck pain, fall down


 


COMPARISON: None available 


 


. 


CT HEAD


 


INDICATION: head/neck pain after fall


 


COMPARISON: None Available.


 


Exposure: One or more of the following individualized dose reduction 


techniques were utilized for this examination:  1. Automated exposure 


control  2. Adjustment of the mA and/or kV according to patient size  3. 


Use of iterative reconstruction technique


 


TECHNIQUE: 5 mm contiguous axial images were obtained from the skull base 


to the vertex in both bone and soft tissue algorithm.  


 


FINDINGS: 


 


Mild scalp contusion identified in the posterior parietal scalp region.  


Mild bilateral periventricular white matter hypodensities likely chronic 


small vessel ischemic disease.


 


No evidence of acute intracranial hemorrhage.   No extra-axial fluid 


collections.


 


No mass effect or midline shift. Ventricular size is appropriate.  Basal 


cisterns are patent.


 


No fractures identified.Gray-white differentiation is preserved.Globes and


orbits are within normal limits.   Paranasal sinuses and mastoid air cells


are clear.   


 


IMPRESSION:


 


No acute intracranial findings. 


 


 


CT CERVICAL SPINE


 


INDICATION: head/neck pain after fall


 


COMPARISON: None Available.


 


Technique: 2.5 mm contiguous axial images were obtained from the skull 


base through the cervicothoracic junction in both bone and soft tissue 


algorithm.  Additional sagittal and coronal reconstructions were also 


performed. 


 


FINDINGS: Vertebral body height and alignment are maintained.  Cervical 


lordosis is preserved.  The lateral masses of C1 are aligned upon C2.  


 


No fractures identified.  The bony canal is patent throughout.


 


Mild intervertebral disc height loss identified throughout cervical spine 


. Moderate due to disc height loss identified at C6-C7 vertebral levels 


with small posterior disc bulge. Mild posterior disc bulge identified at 


C5-C6 vertebral level. 


 


The paraspinous soft tissues are unremarkable.  Visualized intracranial 


contents are unremarkable.  Lung apices are clear. 


 


IMPRESSION:


 


 


1. No acute fracture of the cervical spine.


 


2. Moderate degenerative changes cervical spine.  


 


Electronically signed by: Brennen Damon MD (9/30/2018 5:03 PM) [facility]














DICTATED and SIGNED BY:     BRENNEN DAMON MD


DATE:     09/30/18 1657





Course & Med Decision Making


Course & Med Decision Making


Pertinent Labs and Imaging studies reviewed. (See chart for details)





This is a 62-year-old female patient presenting to the ED today status post 

falling. Patient has contusion to her scalp and is complaining of head and neck 

pain. CT of the head and cervical spine interpreted by radiologist are negative 

for any acute findings, noted for arthritis of the cervical spine. Patient's 

neurological exam is intact. She was given a tetanus shot in the ED. She was 

instructed to follow-up with her own PCP in 1-2 weeks. Ice elevation 

recommended. OTC pain relievers especially Tylenol as needed for pain.





Dragon Disclaimer


Dragon Disclaimer


This electronic medical record was generated, in whole or in part, using a 

voice recognition dictation system.





Departure


Departure


Impression:  


 Primary Impression:  


 Fall from height of less than 3 feet


 Additional Impressions:  


 Closed head injury


 Cervical strain, acute


 Scalp contusion


 DJD (degenerative joint disease), cervical


Disposition:  01 HOME, SELF-CARE


Condition:  STABLE


Referrals:  


UNKNOWN PCP NAME (PCP)


follow up with your doctor in one week


Patient Instructions:  Arthritis, Degenerative-Brief, Cervical Strain and 

Sprain with Rehab-SportsMed, Fall Prevention and Home Safety, Head Injury, Adult





Additional Instructions:  


You were evaluated in the emergency room after falling. Your CT of the head and 

cervical spine are negative for any acute findings. Your CT of the cervical 

spine was noted for arthritis in your neck. Please ice and elevate the affected 

area. Take over-the-counter pain relievers as patient Tylenol as needed for 

pain. Come back to the ED at any point symptoms worsen or you have new 

concerning symptoms including but not limited to confusion, excessive sleepiness

, uncontrolled pain, uncontrolled nausea vomiting.


Scripts


Ondansetron (ZOFRAN ODT) 4 Mg Tab.rapdis


1 TAB SL Q8HRS, #15 TAB


   Prov: JOHN RAE         9/30/18





Problem Qualifiers








 Additional Impressions:  


 Closed head injury


 Encounter type:  initial encounter  Qualified Codes:  S09.90XA - Unspecified 

injury of head, initial encounter


 Cervical strain, acute


 Encounter type:  initial encounter  Qualified Codes:  S16.1XXA - Strain of 

muscle, fascia and tendon at neck level, initial encounter


 Scalp contusion


 Encounter type:  initial encounter  Qualified Codes:  S00.03XA - Contusion of 

scalp, initial encounter


 DJD (degenerative joint disease), cervical


 Spinal osteoarthritis complication:  unspecified spinal osteoarthritis  

Qualified Codes:  M47.812 - Spondylosis without myelopathy or radiculopathy, 

cervical region








JOHN RAE Sep 30, 2018 16:04

## 2018-09-30 NOTE — RAD
Examination: CT head and cervical spine without contrast

 

HISTORY: History of headache, neck pain, fall down

 

COMPARISON: None available 

 

. 

CT HEAD

 

INDICATION: head/neck pain after fall

 

COMPARISON: None Available.

 

Exposure: One or more of the following individualized dose reduction 

techniques were utilized for this examination:  1. Automated exposure 

control  2. Adjustment of the mA and/or kV according to patient size  3. 

Use of iterative reconstruction technique

 

TECHNIQUE: 5 mm contiguous axial images were obtained from the skull base 

to the vertex in both bone and soft tissue algorithm.  

 

FINDINGS: 

 

Mild scalp contusion identified in the posterior parietal scalp region.  

Mild bilateral periventricular white matter hypodensities likely chronic 

small vessel ischemic disease.

 

No evidence of acute intracranial hemorrhage.   No extra-axial fluid 

collections.

 

No mass effect or midline shift. Ventricular size is appropriate.  Basal 

cisterns are patent.

 

No fractures identified.Gray-white differentiation is preserved.Globes and

orbits are within normal limits.   Paranasal sinuses and mastoid air cells

are clear.   

 

IMPRESSION:

 

No acute intracranial findings. 

 

 

CT CERVICAL SPINE

 

INDICATION: head/neck pain after fall

 

COMPARISON: None Available.

 

Technique: 2.5 mm contiguous axial images were obtained from the skull 

base through the cervicothoracic junction in both bone and soft tissue 

algorithm.  Additional sagittal and coronal reconstructions were also 

performed. 

 

FINDINGS: Vertebral body height and alignment are maintained.  Cervical 

lordosis is preserved.  The lateral masses of C1 are aligned upon C2.  

 

No fractures identified.  The bony canal is patent throughout.

 

Mild intervertebral disc height loss identified throughout cervical spine 

. Moderate due to disc height loss identified at C6-C7 vertebral levels 

with small posterior disc bulge. Mild posterior disc bulge identified at 

C5-C6 vertebral level. 

 

The paraspinous soft tissues are unremarkable.  Visualized intracranial 

contents are unremarkable.  Lung apices are clear. 

 

IMPRESSION:

 

 

1. No acute fracture of the cervical spine.

 

2. Moderate degenerative changes cervical spine.  

 

Electronically signed by: Brennen Damon MD (9/30/2018 5:03 PM) [facility]

## 2018-09-30 NOTE — EKG
[facility]

              [address] [place] [address]

Test Date:    2018               Test Time:    15:48:59

Pat Name:     KODAK NUNEZ        Department:   

Patient ID:   [id number]           Room:          

Gender:       F                        Technician:   

:          [id number]               Requested By: JOHN RAE

Order Number: [id number]           Reading MD:   Jones Jacques MD

                                 Measurements

Intervals                              Axis          

Rate:         68                       P:            -18

TN:           156                      QRS:          -26

QRSD:         82                       T:            52

QT:           422                                    

QTc:          449                                    

                           Interpretive Statements

SINUS RHYTHM



Electronically Signed On 10-1-2018 14:03:23 CDT by Jones Jacques MD

## 2022-08-05 NOTE — DS
DATE OF DISCHARGE:  12/19/2017



DISCHARGE DIAGNOSES:  Acute on chronic systolic and diastolic heart failure.



DISCHARGE DIAGNOSIS:  Resolving heart failure.



HOSPITAL COURSE:  The patient is a pleasant 62-year-old female who presented

with acute on chronic systolic and diastolic heart failure.  She was admitted. 

We checked serial enzymes, serial EKGs.  We did cardiac monitoring, physical

therapy and occupational therapy.  We diuresed her.  We consulted cardiology. 

Overall, she did well.  We discharged her to home.



DISPOSITION:  Home.



ACTIVITY:  As tolerated.



DIET:  Low sodium.



MEDICATIONS:  Please see the MRAD.



TOTAL TIME:  39 minutes.

 



______________________________

NIAL SINGH PEREZ DO



DR:  ATUL/maynor  JOB#:  [id number] / [id number]

DD:  12/28/2017 14:29  DT:  12/28/2017 17:10
How Severe Is Your Bcc?: moderate
When Was The Bcc Biopsied? (Optional): 7/19/22